# Patient Record
Sex: MALE | Race: WHITE | Employment: OTHER | URBAN - METROPOLITAN AREA
[De-identification: names, ages, dates, MRNs, and addresses within clinical notes are randomized per-mention and may not be internally consistent; named-entity substitution may affect disease eponyms.]

---

## 2022-11-12 ENCOUNTER — OFFICE VISIT (OUTPATIENT)
Dept: ENDOCRINOLOGY | Age: 67
End: 2022-11-12
Payer: COMMERCIAL

## 2022-11-12 VITALS
WEIGHT: 155 LBS | HEART RATE: 81 BPM | DIASTOLIC BLOOD PRESSURE: 80 MMHG | OXYGEN SATURATION: 98 % | SYSTOLIC BLOOD PRESSURE: 110 MMHG | BODY MASS INDEX: 23.49 KG/M2 | HEIGHT: 68 IN

## 2022-11-12 DIAGNOSIS — N18.30 STAGE 3 CHRONIC KIDNEY DISEASE, UNSPECIFIED WHETHER STAGE 3A OR 3B CKD (HCC): ICD-10-CM

## 2022-11-12 DIAGNOSIS — E11.65 TYPE 2 DIABETES MELLITUS WITH HYPERGLYCEMIA, WITHOUT LONG-TERM CURRENT USE OF INSULIN (HCC): Primary | ICD-10-CM

## 2022-11-12 LAB
CHP ED QC CHECK: ABNORMAL
GLUCOSE BLD-MCNC: 239 MG/DL

## 2022-11-12 PROCEDURE — 3046F HEMOGLOBIN A1C LEVEL >9.0%: CPT | Performed by: INTERNAL MEDICINE

## 2022-11-12 PROCEDURE — G8427 DOCREV CUR MEDS BY ELIG CLIN: HCPCS | Performed by: INTERNAL MEDICINE

## 2022-11-12 PROCEDURE — G8484 FLU IMMUNIZE NO ADMIN: HCPCS | Performed by: INTERNAL MEDICINE

## 2022-11-12 PROCEDURE — 1036F TOBACCO NON-USER: CPT | Performed by: INTERNAL MEDICINE

## 2022-11-12 PROCEDURE — 1123F ACP DISCUSS/DSCN MKR DOCD: CPT | Performed by: INTERNAL MEDICINE

## 2022-11-12 PROCEDURE — 2022F DILAT RTA XM EVC RTNOPTHY: CPT | Performed by: INTERNAL MEDICINE

## 2022-11-12 PROCEDURE — G8420 CALC BMI NORM PARAMETERS: HCPCS | Performed by: INTERNAL MEDICINE

## 2022-11-12 PROCEDURE — 99203 OFFICE O/P NEW LOW 30 MIN: CPT | Performed by: INTERNAL MEDICINE

## 2022-11-12 PROCEDURE — 3017F COLORECTAL CA SCREEN DOC REV: CPT | Performed by: INTERNAL MEDICINE

## 2022-11-12 PROCEDURE — 82962 GLUCOSE BLOOD TEST: CPT | Performed by: INTERNAL MEDICINE

## 2022-11-12 RX ORDER — INSULIN LISPRO 100 [IU]/ML
INJECTION, SUSPENSION SUBCUTANEOUS
Qty: 5 ADJUSTABLE DOSE PRE-FILLED PEN SYRINGE | Refills: 3 | Status: SHIPPED | OUTPATIENT
Start: 2022-11-12 | End: 2022-11-16 | Stop reason: SDUPTHER

## 2022-11-12 RX ORDER — ERTUGLIFLOZIN 15 MG/1
TABLET, FILM COATED ORAL
COMMUNITY
Start: 2022-10-30 | End: 2022-11-12 | Stop reason: ALTCHOICE

## 2022-11-12 RX ORDER — ATORVASTATIN CALCIUM 40 MG/1
TABLET, FILM COATED ORAL
COMMUNITY
Start: 2022-08-18

## 2022-11-12 RX ORDER — PANTOPRAZOLE SODIUM 40 MG/1
TABLET, DELAYED RELEASE ORAL
COMMUNITY
Start: 2022-09-13

## 2022-11-12 RX ORDER — CALCITRIOL 0.25 UG/1
CAPSULE, LIQUID FILLED ORAL
COMMUNITY
Start: 2022-10-17

## 2022-11-12 RX ORDER — PEN NEEDLE, DIABETIC 32 GX 1/6"
1 NEEDLE, DISPOSABLE MISCELLANEOUS 2 TIMES DAILY
Qty: 100 EACH | Refills: 3 | Status: SHIPPED | OUTPATIENT
Start: 2022-11-12

## 2022-11-12 NOTE — PROGRESS NOTES
2022    Assessment:       Diagnosis Orders   1. Type 2 diabetes mellitus with hyperglycemia, without long-term current use of insulin ([facility])  Basic Metabolic Panel    POCT Glucose    C-Peptide      2. Stage 3 chronic kidney disease, unspecified whether stage 3a or 3b CKD ([facility] [address]              PLAN:     Discontinue Steglatro  Start patient on 75/25 Humalog 10 units twice daily  Discussed goals of A1c of 7 or lower  Fasting goal of 100-1 40  Postprandial 1 40-1 80  To test blood sugars twice daily  Patient to come to the office to have a 2-week CGM continuous glucose monitoring  More than 50% of 30-minute spent patient education counseling  Diabetes education provided today:    Continuous Glucose monitor. How it works and checks blood sugars every 5 min. for 4 days during our tests. Managing high and low sugar readings. Rotation of sites for subcutaneous medication injection. Orders Placed This Encounter   Medications    insulin lispro protamine & lispro (HUMALOG MIX 75/25 KWIKPEN) (75-25) 100 UNIT per ML SUPN injection pen     Sig: 10 units twice a day for glucose more than 100     Dispense:  5 Adjustable Dose Pre-filled Pen Syringe     Refill:  3    Insulin Pen Needle (NOVOFINE PLUS PEN NEEDLE) 32G X 4 MM MISC     Si each by Does not apply route 2 times daily     Dispense:  100 each     Refill:  3         Orders Placed This Encounter   Procedures    POCT Glucose     No orders of the defined types were placed in this encounter. No follow-ups on file.   Subjective:     Chief Complaint   Patient presents with    New Patient    Diabetes     Vitals:    22 1208   BP: 110/80   Site: Left Upper Arm   Pulse: 81   SpO2: 98%   Weight: 155 lb (70.3 kg)   Height: 5' 8\" (1.727 m)     Wt Readings from Last 3 Encounters:   22 155 lb (70.3 kg)   12/17/15 155 lb (70.3 kg)   11/03/15 155 lb (70.3 kg)     BP Readings from Last 3 Encounters:   22 110/80     Patient referred here for uncontrolled type 2 diabetes which she has had for 3+ years history of chronic kidney disease and neuropathy seeing nephrologist currently is taking only Steglatro for diabetes blood sugars between 100-20 400 labs done recently showed a glucose of 400 patient is brought normal BMI denies any hypoglycemia was seeing another endocrinologist before hemoglobin A1c done recently was around 9 no recent chemistries to review    Diabetes  He presents for his initial diabetic visit. He has type 2 diabetes mellitus. The initial diagnosis of diabetes was made 3 years ago. Associated symptoms include fatigue. Pertinent negatives for diabetes include no polydipsia, no polyuria and no weight loss. Diabetic complications include nephropathy and peripheral neuropathy. Current diabetic treatment includes oral agent (monotherapy). His overall blood glucose range is >200 mg/dl.  (Hemoglobin A1c was 9 few weeks ago)   Past Medical History:   Diagnosis Date    Neuropathy      Past Surgical History:   Procedure Laterality Date    BACK SURGERY      KIDNEY STONE SURGERY       Social History     Socioeconomic History    Marital status:      Spouse name: Not on file    Number of children: Not on file    Years of education: Not on file    Highest education level: Not on file   Occupational History    Not on file   Tobacco Use    Smoking status: Former    Smokeless tobacco: Not on file    Tobacco comments:     april 2015   Substance and Sexual Activity    Alcohol use: No     Alcohol/week: 0.0 standard drinks    Drug use: No    Sexual activity: Not on file   Other Topics Concern    Not on file   Social History Narrative    Not on file     Social Determinants of Health     Financial Resource Strain: Not on file   Food Insecurity: Not on file   Transportation Needs: Not on file   Physical Activity: Not on file   Stress: Not on file   Social Connections: Not on file   Intimate Partner Violence: Not on file   Housing Stability: Not on file     No family history on file. No Known Allergies    Current Outpatient Medications:     atorvastatin (LIPITOR) 40 MG tablet, , Disp: , Rfl:     metoprolol tartrate (LOPRESSOR) 25 MG tablet, , Disp: , Rfl:     pantoprazole (PROTONIX) 40 MG tablet, , Disp: , Rfl:     calcitRIOL (ROCALTROL) 0.25 MCG capsule, , Disp: , Rfl:     STEGLATRO 15 MG TABS, , Disp: , Rfl:     ASPIRIN 81 PO, Take by mouth, Disp: , Rfl:   Lab Results   Component Value Date     10/19/2022    K 4.2 10/19/2022     10/19/2022    CREATININE 1.95 (H) 10/19/2022    GLUCOSE 239 (H) 11/12/2022    CALCIUM 9.8 10/19/2022     Lab Results   Component Value Date    WBC 6.6 10/19/2022    HGB 15.5 10/19/2022    HCT 48.6 10/19/2022    MCV 92 10/19/2022     10/19/2022     No results found for: LABA1C  No results found for: CHOLFAST, TRIGLYCFAST, HDL, LDLCALC, CHOL, TRIG  No results found for: TESTM  No results found for: TSH, TSHREFLEX, FT3, T4FREE  No results found for: TPOABS    Review of Systems   Constitutional:  Positive for fatigue. Negative for weight loss. Cardiovascular: Negative. Endocrine: Negative for polydipsia and polyuria. Genitourinary: Negative. Neurological: Negative. All other systems reviewed and are negative. Objective:   Physical Exam  Vitals reviewed. Constitutional:       General: He is not in acute distress. Appearance: Normal appearance. He is normal weight. HENT:      Head: Normocephalic and atraumatic. Right Ear: External ear normal.      Left Ear: External ear normal.      Nose: Nose normal.   Eyes:      General: No scleral icterus. Right eye: No discharge. Left eye: No discharge. Extraocular Movements: Extraocular movements intact. Conjunctiva/sclera: Conjunctivae normal.   Cardiovascular:      Rate and Rhythm: Normal rate and regular rhythm. Heart sounds: Normal heart sounds. Pulmonary:      Effort: Pulmonary effort is normal.      Breath sounds: Normal breath sounds.  No wheezing or rhonchi. Abdominal:      Palpations: Abdomen is soft. Musculoskeletal:         General: Normal range of motion. Cervical back: Normal range of motion and neck supple. Feet:    Skin:     General: Skin is warm and dry. Findings: No lesion or rash. Neurological:      General: No focal deficit present. Mental Status: He is alert and oriented to person, place, and time.    Psychiatric:         Mood and Affect: Mood normal.         Behavior: Behavior normal.

## 2022-11-15 ENCOUNTER — OFFICE VISIT (OUTPATIENT)
Dept: URBAN - METROPOLITAN AREA CLINIC 63 | Facility: CLINIC | Age: 67
End: 2022-11-15
Payer: COMMERCIAL

## 2022-11-15 DIAGNOSIS — E11.9 TYPE 2 DIABETES MELLITUS W/O COMPLICATION: Primary | ICD-10-CM

## 2022-11-15 DIAGNOSIS — H25.813 COMBINED FORMS OF AGE-RELATED CATARACT, BILATERAL: ICD-10-CM

## 2022-11-15 DIAGNOSIS — H04.123 DRY EYE SYNDROME OF BILATERAL LACRIMAL GLANDS: ICD-10-CM

## 2022-11-15 PROCEDURE — 92004 COMPRE OPH EXAM NEW PT 1/>: CPT | Performed by: OPTOMETRIST

## 2022-11-15 ASSESSMENT — INTRAOCULAR PRESSURE
OD: 22
OS: 12

## 2022-11-15 NOTE — IMPRESSION/PLAN
Impression: Type 2 diabetes mellitus w/o complication: Y54.8. Plan: Diabetes type II: no background retinopathy, no signs of neovascularization noted. Discussed ocular and systemic benefits of blood sugar control. Emphasize importance of annual dilated exams.

## 2022-11-15 NOTE — IMPRESSION/PLAN
Impression: Combined forms of age-related cataract, bilateral: H25.813. Plan: Cataracts account for the patient's complaints. Discussed all risks, benefits, procedures and recovery. Patient understands changing glasses will not improve vision. Patient desires to have surgery and referred to Dr. Gamaliel Madison for phacoemulsification with intraocular lens.

## 2022-11-16 RX ORDER — INSULIN LISPRO 100 [IU]/ML
INJECTION, SUSPENSION SUBCUTANEOUS
Qty: 45 ADJUSTABLE DOSE PRE-FILLED PEN SYRINGE | Refills: 3 | Status: SHIPPED | OUTPATIENT
Start: 2022-11-16

## 2022-11-28 ENCOUNTER — NURSE ONLY (OUTPATIENT)
Dept: ENDOCRINOLOGY | Age: 67
End: 2022-11-28

## 2022-11-28 DIAGNOSIS — E11.65 TYPE 2 DIABETES MELLITUS WITH HYPERGLYCEMIA, WITHOUT LONG-TERM CURRENT USE OF INSULIN (HCC): Primary | ICD-10-CM

## 2022-12-10 ENCOUNTER — OFFICE VISIT (OUTPATIENT)
Dept: ENDOCRINOLOGY | Age: 67
End: 2022-12-10

## 2022-12-10 VITALS
HEART RATE: 77 BPM | BODY MASS INDEX: 24.4 KG/M2 | SYSTOLIC BLOOD PRESSURE: 130 MMHG | WEIGHT: 161 LBS | OXYGEN SATURATION: 98 % | DIASTOLIC BLOOD PRESSURE: 82 MMHG | HEIGHT: 68 IN

## 2022-12-10 DIAGNOSIS — E11.65 TYPE 2 DIABETES MELLITUS WITH HYPERGLYCEMIA, WITHOUT LONG-TERM CURRENT USE OF INSULIN (HCC): Primary | ICD-10-CM

## 2022-12-10 LAB
CHP ED QC CHECK: NORMAL
GLUCOSE BLD-MCNC: 141 MG/DL

## 2022-12-10 RX ORDER — FLASH GLUCOSE SENSOR
1 KIT MISCELLANEOUS
Qty: 2 EACH | Refills: 3 | Status: SHIPPED | OUTPATIENT
Start: 2022-12-10

## 2022-12-10 RX ORDER — FLASH GLUCOSE SCANNING READER
1 EACH MISCELLANEOUS DAILY
Qty: 1 EACH | Refills: 0 | Status: SHIPPED | OUTPATIENT
Start: 2022-12-10

## 2022-12-10 NOTE — PROGRESS NOTES
12/10/2022    Assessment:       Diagnosis Orders   1. Type 2 diabetes mellitus with hyperglycemia, without long-term current use of insulin ([facility])  POCT Glucose            PLAN:     Orders Placed This Encounter   Procedures    Lipid Panel     Standing Status:   Future     Standing Expiration Date:   12/10/2023    Hemoglobin A1C     Standing Status:   Future     Standing Expiration Date:       Basic Metabolic Panel, Fasting     Standing Status:   Future     Standing Expiration Date:   12/10/2023    POCT Glucose     Orders Placed This Encounter   Medications    Continuous Blood Gluc Sensor (FREESTYLE JAN 2 SENSOR) MISC     Si each by Does not apply route every 14 days     Dispense:  2 each     Refill:  3    Continuous Blood Gluc  (FREESTYLE JAN 2 READER) HIMANSHU     Si each by Does not apply route daily     Dispense:  1 each     Refill:  0     Prescription given for freestyle jan continuous 75/25 Humalog 10 units twice a day A1c goal of 7.5 or lower    Orders Placed This Encounter   Procedures    POCT Glucose     No orders of the defined types were placed in this encounter. No follow-ups on file. Subjective:     Chief Complaint   Patient presents with    Diabetes     Vitals:    12/10/22 0935   BP: 130/82   Pulse: 77   SpO2: 98%   Weight: 161 lb (73 kg)   Height: 5' 8\" (1.727 m)     Wt Readings from Last 3 Encounters:   12/10/22 161 lb (73 kg)   22 155 lb (70.3 kg)   12/17/15 155 lb (70.3 kg)     BP Readings from Last 3 Encounters:   12/10/22 130/82   22 110/80     Follow-up on type 2 diabetes patient on 75/25 Humalog takes 10 units twice a day overall blood sugars mostly in the 100 range denies any hypoglycemia had freestyle jan continuous glucose monitoring done which was reviewed updated    Diabetes  He presents for his follow-up diabetic visit. He has type 2 diabetes mellitus. Symptoms are improving. Current diabetic treatment includes insulin injections.  He is currently taking insulin pre-breakfast and pre-dinner. His overall blood glucose range is 140-180 mg/dl. (Review 2-week adal downloads from November average blood sugar was 169  63% was time in range  29% was high  8% was very high  No hypoglycemia)   Past Medical History:   Diagnosis Date    Neuropathy      Past Surgical History:   Procedure Laterality Date    BACK SURGERY      KIDNEY STONE SURGERY       Social History     Socioeconomic History    Marital status:      Spouse name: Not on file    Number of children: Not on file    Years of education: Not on file    Highest education level: Not on file   Occupational History    Not on file   Tobacco Use    Smoking status: Former    Smokeless tobacco: Never    Tobacco comments:     april 2015   Vaping Use    Vaping Use: Never used   Substance and Sexual Activity    Alcohol use: No     Alcohol/week: 0.0 standard drinks    Drug use: No    Sexual activity: Not Currently   Other Topics Concern    Not on file   Social History Narrative    Not on file     Social Determinants of Health     Financial Resource Strain: Not on file   Food Insecurity: Not on file   Transportation Needs: Not on file   Physical Activity: Not on file   Stress: Not on file   Social Connections: Not on file   Intimate Partner Violence: Not on file   Housing Stability: Not on file     No family history on file.   No Known Allergies    Current Outpatient Medications:     insulin lispro protamine & lispro (HUMALOG MIX 75/25 KWIKPEN) (75-25) 100 UNIT per ML SUPN injection pen, 10 units twice a day for glucose more than 100, Disp: 45 Adjustable Dose Pre-filled Pen Syringe, Rfl: 3    atorvastatin (LIPITOR) 40 MG tablet, , Disp: , Rfl:     metoprolol tartrate (LOPRESSOR) 25 MG tablet, , Disp: , Rfl:     pantoprazole (PROTONIX) 40 MG tablet, , Disp: , Rfl:     calcitRIOL (ROCALTROL) 0.25 MCG capsule, , Disp: , Rfl:     ASPIRIN 81 PO, Take by mouth, Disp: , Rfl:     Insulin Pen Needle (NOVOFINE PLUS PEN NEEDLE) 32G X 4 MM MISC, 1 each by Does not apply route 2 times daily, Disp: 100 each, Rfl: 3  Lab Results   Component Value Date     10/19/2022    K 4.2 10/19/2022     10/19/2022    CREATININE 1.95 (H) 10/19/2022    GLUCOSE 141 12/10/2022    CALCIUM 9.8 10/19/2022     Lab Results   Component Value Date    WBC 6.6 10/19/2022    HGB 15.5 10/19/2022    HCT 48.6 10/19/2022    MCV 92 10/19/2022     10/19/2022     No results found for: LABA1C  No results found for: CHOLFAST, TRIGLYCFAST, HDL, LDLCALC, CHOL, TRIG  No results found for: TESTM  No results found for: TSH, TSHREFLEX, FT3, T4FREE  No results found for: TPOABS    Review of Systems   Cardiovascular: Negative. Endocrine: Negative. Neurological: Negative. All other systems reviewed and are negative. Objective:   Physical Exam  Vitals reviewed. Constitutional:       General: He is not in acute distress. Appearance: Normal appearance. HENT:      Head: Normocephalic and atraumatic. Right Ear: External ear normal.      Left Ear: External ear normal.      Nose: Nose normal.   Eyes:      General: No scleral icterus. Right eye: No discharge. Left eye: No discharge. Extraocular Movements: Extraocular movements intact. Conjunctiva/sclera: Conjunctivae normal.   Cardiovascular:      Rate and Rhythm: Normal rate. Pulmonary:      Effort: Pulmonary effort is normal.   Abdominal:      Palpations: Abdomen is soft. Musculoskeletal:         General: Normal range of motion. Cervical back: Normal range of motion and neck supple. Neurological:      General: No focal deficit present. Mental Status: He is alert and oriented to person, place, and time.    Psychiatric:         Mood and Affect: Mood normal.         Behavior: Behavior normal.

## 2022-12-13 DIAGNOSIS — E11.65 TYPE 2 DIABETES MELLITUS WITH HYPERGLYCEMIA, WITHOUT LONG-TERM CURRENT USE OF INSULIN (HCC): ICD-10-CM

## 2022-12-13 RX ORDER — FLASH GLUCOSE SENSOR
1 KIT MISCELLANEOUS
Qty: 6 EACH | Refills: 3 | Status: SHIPPED | OUTPATIENT
Start: 2022-12-13

## 2022-12-30 RX ORDER — PEN NEEDLE, DIABETIC 32 GX 1/6"
1 NEEDLE, DISPOSABLE MISCELLANEOUS 2 TIMES DAILY
Qty: 100 EACH | Refills: 3 | Status: SHIPPED | OUTPATIENT
Start: 2022-12-30

## 2023-01-06 ENCOUNTER — OFFICE VISIT (OUTPATIENT)
Dept: URBAN - METROPOLITAN AREA CLINIC 63 | Facility: CLINIC | Age: 68
End: 2023-01-06
Payer: COMMERCIAL

## 2023-01-06 DIAGNOSIS — E11.9 TYPE 2 DIABETES MELLITUS W/O COMPLICATION: ICD-10-CM

## 2023-01-06 DIAGNOSIS — H25.813 COMBINED FORMS OF AGE-RELATED CATARACT, BILATERAL: Primary | ICD-10-CM

## 2023-01-06 PROCEDURE — 92004 COMPRE OPH EXAM NEW PT 1/>: CPT | Performed by: OPHTHALMOLOGY

## 2023-01-06 ASSESSMENT — VISUAL ACUITY
OS: 20/200
OD: 20/200

## 2023-01-06 NOTE — IMPRESSION/PLAN
Impression: Type 2 diabetes mellitus w/o complication: L88.1. Plan: Diabetes type II: no background retinopathy, no signs of neovascularization noted. Discussed ocular and systemic benefits of blood sugar control.

## 2023-01-06 NOTE — IMPRESSION/PLAN
Impression: Combined forms of age-related cataract, bilateral: H25.813. Plan: Surgery not recommended at this brandan, Cont to monitor.

## 2023-02-21 LAB
ANION GAP IN SER/PLAS: 15 MMOL/L (ref 10–20)
BASOPHILS (10*3/UL) IN BLOOD BY AUTOMATED COUNT: 0.04 X10E9/L (ref 0–0.1)
BASOPHILS/100 LEUKOCYTES IN BLOOD BY AUTOMATED COUNT: 0.7 % (ref 0–2)
CALCIUM (MG/DL) IN SER/PLAS: 10.2 MG/DL (ref 8.6–10.3)
CARBON DIOXIDE, TOTAL (MMOL/L) IN SER/PLAS: 25 MMOL/L (ref 21–32)
CHLORIDE (MMOL/L) IN SER/PLAS: 108 MMOL/L (ref 98–107)
CREATININE (MG/DL) IN SER/PLAS: 1.88 MG/DL (ref 0.5–1.3)
EOSINOPHILS (10*3/UL) IN BLOOD BY AUTOMATED COUNT: 0.1 X10E9/L (ref 0–0.7)
EOSINOPHILS/100 LEUKOCYTES IN BLOOD BY AUTOMATED COUNT: 1.7 % (ref 0–6)
ERYTHROCYTE DISTRIBUTION WIDTH (RATIO) BY AUTOMATED COUNT: 13.3 % (ref 11.5–14.5)
ERYTHROCYTE MEAN CORPUSCULAR HEMOGLOBIN CONCENTRATION (G/DL) BY AUTOMATED: 31.7 G/DL (ref 32–36)
ERYTHROCYTE MEAN CORPUSCULAR VOLUME (FL) BY AUTOMATED COUNT: 93 FL (ref 80–100)
ERYTHROCYTES (10*6/UL) IN BLOOD BY AUTOMATED COUNT: 4.9 X10E12/L (ref 4.5–5.9)
GFR MALE: 38 ML/MIN/1.73M2
GLUCOSE (MG/DL) IN SER/PLAS: 81 MG/DL (ref 74–99)
HEMATOCRIT (%) IN BLOOD BY AUTOMATED COUNT: 45.7 % (ref 41–52)
HEMOGLOBIN (G/DL) IN BLOOD: 14.5 G/DL (ref 13.5–17.5)
IMMATURE GRANULOCYTES/100 LEUKOCYTES IN BLOOD BY AUTOMATED COUNT: 0.3 % (ref 0–0.9)
LEUKOCYTES (10*3/UL) IN BLOOD BY AUTOMATED COUNT: 5.9 X10E9/L (ref 4.4–11.3)
LYMPHOCYTES (10*3/UL) IN BLOOD BY AUTOMATED COUNT: 1.57 X10E9/L (ref 1.2–4.8)
LYMPHOCYTES/100 LEUKOCYTES IN BLOOD BY AUTOMATED COUNT: 26.5 % (ref 13–44)
MONOCYTES (10*3/UL) IN BLOOD BY AUTOMATED COUNT: 0.43 X10E9/L (ref 0.1–1)
MONOCYTES/100 LEUKOCYTES IN BLOOD BY AUTOMATED COUNT: 7.3 % (ref 2–10)
NEUTROPHILS (10*3/UL) IN BLOOD BY AUTOMATED COUNT: 3.77 X10E9/L (ref 1.2–7.7)
NEUTROPHILS/100 LEUKOCYTES IN BLOOD BY AUTOMATED COUNT: 63.5 % (ref 40–80)
PARATHYRIN INTACT (PG/ML) IN SER/PLAS: 61 PG/ML (ref 18.5–88)
PHOSPHATE (MG/DL) IN SER/PLAS: 3 MG/DL (ref 2.5–4.9)
PLATELETS (10*3/UL) IN BLOOD AUTOMATED COUNT: 130 X10E9/L (ref 150–450)
POTASSIUM (MMOL/L) IN SER/PLAS: 4.6 MMOL/L (ref 3.5–5.3)
SODIUM (MMOL/L) IN SER/PLAS: 143 MMOL/L (ref 136–145)
UREA NITROGEN (MG/DL) IN SER/PLAS: 20 MG/DL (ref 6–23)

## 2023-03-16 DIAGNOSIS — E11.65 TYPE 2 DIABETES MELLITUS WITH HYPERGLYCEMIA, WITHOUT LONG-TERM CURRENT USE OF INSULIN (HCC): ICD-10-CM

## 2023-03-16 LAB
ANION GAP SERPL CALCULATED.3IONS-SCNC: 11 MEQ/L (ref 9–15)
BUN SERPL-MCNC: 21 MG/DL (ref 8–23)
CALCIUM SERPL-MCNC: 9.4 MG/DL (ref 8.5–9.9)
CHLORIDE SERPL-SCNC: 106 MEQ/L (ref 95–107)
CHOLEST SERPL-MCNC: 113 MG/DL (ref 0–199)
CO2 SERPL-SCNC: 25 MEQ/L (ref 20–31)
CREAT SERPL-MCNC: 1.72 MG/DL (ref 0.7–1.2)
GLUCOSE FASTING: 233 MG/DL (ref 70–99)
HBA1C MFR BLD: 8.2 % (ref 4.8–5.9)
HDLC SERPL-MCNC: 34 MG/DL (ref 40–59)
LDLC SERPL CALC-MCNC: 58 MG/DL (ref 0–129)
POTASSIUM SERPL-SCNC: 4.5 MEQ/L (ref 3.4–4.9)
SODIUM SERPL-SCNC: 142 MEQ/L (ref 135–144)
TRIGL SERPL-MCNC: 107 MG/DL (ref 0–150)

## 2023-03-28 LAB
ANION GAP IN SER/PLAS: 12 MMOL/L (ref 10–20)
CALCIUM (MG/DL) IN SER/PLAS: 9.4 MG/DL (ref 8.6–10.3)
CARBON DIOXIDE, TOTAL (MMOL/L) IN SER/PLAS: 26 MMOL/L (ref 21–32)
CHLORIDE (MMOL/L) IN SER/PLAS: 105 MMOL/L (ref 98–107)
CREATININE (MG/DL) IN SER/PLAS: 1.86 MG/DL (ref 0.5–1.3)
GFR MALE: 38 ML/MIN/1.73M2
GLUCOSE (MG/DL) IN SER/PLAS: 237 MG/DL (ref 74–99)
POTASSIUM (MMOL/L) IN SER/PLAS: 4.2 MMOL/L (ref 3.5–5.3)
SODIUM (MMOL/L) IN SER/PLAS: 139 MMOL/L (ref 136–145)
UREA NITROGEN (MG/DL) IN SER/PLAS: 24 MG/DL (ref 6–23)

## 2023-04-20 ENCOUNTER — OFFICE VISIT (OUTPATIENT)
Dept: ENDOCRINOLOGY | Age: 68
End: 2023-04-20
Payer: MEDICARE

## 2023-04-20 VITALS
SYSTOLIC BLOOD PRESSURE: 125 MMHG | OXYGEN SATURATION: 92 % | HEART RATE: 81 BPM | WEIGHT: 162 LBS | DIASTOLIC BLOOD PRESSURE: 75 MMHG | HEIGHT: 68 IN | BODY MASS INDEX: 24.55 KG/M2

## 2023-04-20 DIAGNOSIS — E11.65 TYPE 2 DIABETES MELLITUS WITH HYPERGLYCEMIA, WITHOUT LONG-TERM CURRENT USE OF INSULIN (HCC): Primary | ICD-10-CM

## 2023-04-20 LAB
CHP ED QC CHECK: NORMAL
GLUCOSE BLD-MCNC: 156 MG/DL

## 2023-04-20 PROCEDURE — G8427 DOCREV CUR MEDS BY ELIG CLIN: HCPCS | Performed by: INTERNAL MEDICINE

## 2023-04-20 PROCEDURE — 1036F TOBACCO NON-USER: CPT | Performed by: INTERNAL MEDICINE

## 2023-04-20 PROCEDURE — 82962 GLUCOSE BLOOD TEST: CPT | Performed by: INTERNAL MEDICINE

## 2023-04-20 PROCEDURE — 2022F DILAT RTA XM EVC RTNOPTHY: CPT | Performed by: INTERNAL MEDICINE

## 2023-04-20 PROCEDURE — 1123F ACP DISCUSS/DSCN MKR DOCD: CPT | Performed by: INTERNAL MEDICINE

## 2023-04-20 PROCEDURE — 3052F HG A1C>EQUAL 8.0%<EQUAL 9.0%: CPT | Performed by: INTERNAL MEDICINE

## 2023-04-20 PROCEDURE — G8420 CALC BMI NORM PARAMETERS: HCPCS | Performed by: INTERNAL MEDICINE

## 2023-04-20 PROCEDURE — 99213 OFFICE O/P EST LOW 20 MIN: CPT | Performed by: INTERNAL MEDICINE

## 2023-04-20 PROCEDURE — 3017F COLORECTAL CA SCREEN DOC REV: CPT | Performed by: INTERNAL MEDICINE

## 2023-04-27 ASSESSMENT — ENCOUNTER SYMPTOMS: EYES NEGATIVE: 1

## 2023-06-20 LAB
ANION GAP IN SER/PLAS: 11 MMOL/L (ref 10–20)
BASOPHILS (10*3/UL) IN BLOOD BY AUTOMATED COUNT: 0.05 X10E9/L (ref 0–0.1)
BASOPHILS/100 LEUKOCYTES IN BLOOD BY AUTOMATED COUNT: 0.8 % (ref 0–2)
CALCIUM (MG/DL) IN SER/PLAS: 10.3 MG/DL (ref 8.6–10.3)
CARBON DIOXIDE, TOTAL (MMOL/L) IN SER/PLAS: 28 MMOL/L (ref 21–32)
CHLORIDE (MMOL/L) IN SER/PLAS: 104 MMOL/L (ref 98–107)
CREATININE (MG/DL) IN SER/PLAS: 1.96 MG/DL (ref 0.5–1.3)
EOSINOPHILS (10*3/UL) IN BLOOD BY AUTOMATED COUNT: 0.21 X10E9/L (ref 0–0.7)
EOSINOPHILS/100 LEUKOCYTES IN BLOOD BY AUTOMATED COUNT: 3.3 % (ref 0–6)
ERYTHROCYTE DISTRIBUTION WIDTH (RATIO) BY AUTOMATED COUNT: 13.5 % (ref 11.5–14.5)
ERYTHROCYTE MEAN CORPUSCULAR HEMOGLOBIN CONCENTRATION (G/DL) BY AUTOMATED: 32.6 G/DL (ref 32–36)
ERYTHROCYTE MEAN CORPUSCULAR VOLUME (FL) BY AUTOMATED COUNT: 93 FL (ref 80–100)
ERYTHROCYTES (10*6/UL) IN BLOOD BY AUTOMATED COUNT: 4.92 X10E12/L (ref 4.5–5.9)
GFR MALE: 36 ML/MIN/1.73M2
GLUCOSE (MG/DL) IN SER/PLAS: 232 MG/DL (ref 74–99)
HEMATOCRIT (%) IN BLOOD BY AUTOMATED COUNT: 45.7 % (ref 41–52)
HEMOGLOBIN (G/DL) IN BLOOD: 14.9 G/DL (ref 13.5–17.5)
IMMATURE GRANULOCYTES/100 LEUKOCYTES IN BLOOD BY AUTOMATED COUNT: 0.3 % (ref 0–0.9)
LEUKOCYTES (10*3/UL) IN BLOOD BY AUTOMATED COUNT: 6.5 X10E9/L (ref 4.4–11.3)
LYMPHOCYTES (10*3/UL) IN BLOOD BY AUTOMATED COUNT: 1.33 X10E9/L (ref 1.2–4.8)
LYMPHOCYTES/100 LEUKOCYTES IN BLOOD BY AUTOMATED COUNT: 20.6 % (ref 13–44)
MONOCYTES (10*3/UL) IN BLOOD BY AUTOMATED COUNT: 0.53 X10E9/L (ref 0.1–1)
MONOCYTES/100 LEUKOCYTES IN BLOOD BY AUTOMATED COUNT: 8.2 % (ref 2–10)
NEUTROPHILS (10*3/UL) IN BLOOD BY AUTOMATED COUNT: 4.32 X10E9/L (ref 1.2–7.7)
NEUTROPHILS/100 LEUKOCYTES IN BLOOD BY AUTOMATED COUNT: 66.8 % (ref 40–80)
PARATHYRIN INTACT (PG/ML) IN SER/PLAS: 37.9 PG/ML (ref 18.5–88)
PHOSPHATE (MG/DL) IN SER/PLAS: 3 MG/DL (ref 2.5–4.9)
PLATELETS (10*3/UL) IN BLOOD AUTOMATED COUNT: 155 X10E9/L (ref 150–450)
POTASSIUM (MMOL/L) IN SER/PLAS: 5.2 MMOL/L (ref 3.5–5.3)
SODIUM (MMOL/L) IN SER/PLAS: 138 MMOL/L (ref 136–145)
UREA NITROGEN (MG/DL) IN SER/PLAS: 22 MG/DL (ref 6–23)

## 2023-07-11 LAB
ALANINE AMINOTRANSFERASE (SGPT) (U/L) IN SER/PLAS: 22 U/L (ref 10–52)
ALBUMIN (G/DL) IN SER/PLAS: 4.1 G/DL (ref 3.4–5)
ALBUMIN (MG/L) IN URINE: 177.1 MG/L
ALBUMIN/CREATININE (UG/MG) IN URINE: 115 UG/MG CRT (ref 0–30)
ALKALINE PHOSPHATASE (U/L) IN SER/PLAS: 46 U/L (ref 33–136)
ANION GAP IN SER/PLAS: 13 MMOL/L (ref 10–20)
APPEARANCE, URINE: CLEAR
ASPARTATE AMINOTRANSFERASE (SGOT) (U/L) IN SER/PLAS: 20 U/L (ref 9–39)
BASOPHILS (10*3/UL) IN BLOOD BY AUTOMATED COUNT: 0.04 X10E9/L (ref 0–0.1)
BASOPHILS/100 LEUKOCYTES IN BLOOD BY AUTOMATED COUNT: 0.6 % (ref 0–2)
BILIRUBIN TOTAL (MG/DL) IN SER/PLAS: 0.7 MG/DL (ref 0–1.2)
BILIRUBIN, URINE: NEGATIVE
BLOOD, URINE: NEGATIVE
CALCIUM (MG/DL) IN SER/PLAS: 9.7 MG/DL (ref 8.6–10.3)
CARBON DIOXIDE, TOTAL (MMOL/L) IN SER/PLAS: 26 MMOL/L (ref 21–32)
CHLORIDE (MMOL/L) IN SER/PLAS: 108 MMOL/L (ref 98–107)
CHOLESTEROL (MG/DL) IN SER/PLAS: 106 MG/DL (ref 0–199)
CHOLESTEROL IN HDL (MG/DL) IN SER/PLAS: 38.2 MG/DL
CHOLESTEROL IN LDL (MG/DL) IN SER/PLAS BY DIRECT ASSAY: 62 MG/DL (ref 0–129)
CHOLESTEROL/HDL RATIO: 2.8
COLOR, URINE: YELLOW
CREATININE (MG/DL) IN SER/PLAS: 1.92 MG/DL (ref 0.5–1.3)
CREATININE (MG/DL) IN URINE: 154 MG/DL (ref 20–370)
EOSINOPHILS (10*3/UL) IN BLOOD BY AUTOMATED COUNT: 0.18 X10E9/L (ref 0–0.7)
EOSINOPHILS/100 LEUKOCYTES IN BLOOD BY AUTOMATED COUNT: 2.6 % (ref 0–6)
ERYTHROCYTE DISTRIBUTION WIDTH (RATIO) BY AUTOMATED COUNT: 13.6 % (ref 11.5–14.5)
ERYTHROCYTE MEAN CORPUSCULAR HEMOGLOBIN CONCENTRATION (G/DL) BY AUTOMATED: 32.2 G/DL (ref 32–36)
ERYTHROCYTE MEAN CORPUSCULAR VOLUME (FL) BY AUTOMATED COUNT: 93 FL (ref 80–100)
ERYTHROCYTES (10*6/UL) IN BLOOD BY AUTOMATED COUNT: 4.81 X10E12/L (ref 4.5–5.9)
ESTIMATED AVERAGE GLUCOSE FOR HBA1C: 171 MG/DL
GFR MALE: 37 ML/MIN/1.73M2
GLUCOSE (MG/DL) IN SER/PLAS: 119 MG/DL (ref 74–99)
GLUCOSE, URINE: NEGATIVE MG/DL
HEMATOCRIT (%) IN BLOOD BY AUTOMATED COUNT: 44.7 % (ref 41–52)
HEMOGLOBIN (G/DL) IN BLOOD: 14.4 G/DL (ref 13.5–17.5)
HEMOGLOBIN A1C/HEMOGLOBIN TOTAL IN BLOOD: 7.6 %
HYALINE CASTS, URINE: ABNORMAL /LPF
IMMATURE GRANULOCYTES/100 LEUKOCYTES IN BLOOD BY AUTOMATED COUNT: 0.6 % (ref 0–0.9)
KETONES, URINE: NEGATIVE MG/DL
LDL: 57 MG/DL (ref 0–99)
LEUKOCYTE ESTERASE, URINE: NEGATIVE
LEUKOCYTES (10*3/UL) IN BLOOD BY AUTOMATED COUNT: 6.9 X10E9/L (ref 4.4–11.3)
LYMPHOCYTES (10*3/UL) IN BLOOD BY AUTOMATED COUNT: 1.81 X10E9/L (ref 1.2–4.8)
LYMPHOCYTES/100 LEUKOCYTES IN BLOOD BY AUTOMATED COUNT: 26.4 % (ref 13–44)
MONOCYTES (10*3/UL) IN BLOOD BY AUTOMATED COUNT: 0.74 X10E9/L (ref 0.1–1)
MONOCYTES/100 LEUKOCYTES IN BLOOD BY AUTOMATED COUNT: 10.8 % (ref 2–10)
MUCUS, URINE: ABNORMAL /LPF
NEUTROPHILS (10*3/UL) IN BLOOD BY AUTOMATED COUNT: 4.04 X10E9/L (ref 1.2–7.7)
NEUTROPHILS/100 LEUKOCYTES IN BLOOD BY AUTOMATED COUNT: 59 % (ref 40–80)
NITRITE, URINE: NEGATIVE
PH, URINE: 5 (ref 5–8)
PLATELETS (10*3/UL) IN BLOOD AUTOMATED COUNT: 156 X10E9/L (ref 150–450)
POTASSIUM (MMOL/L) IN SER/PLAS: 4.6 MMOL/L (ref 3.5–5.3)
PROTEIN TOTAL: 6.7 G/DL (ref 6.4–8.2)
PROTEIN, URINE: ABNORMAL MG/DL
RBC, URINE: ABNORMAL /HPF (ref 0–5)
SODIUM (MMOL/L) IN SER/PLAS: 142 MMOL/L (ref 136–145)
SPECIFIC GRAVITY, URINE: 1.02 (ref 1–1.03)
SQUAMOUS EPITHELIAL CELLS, URINE: <1 /HPF
THYROTROPIN (MIU/L) IN SER/PLAS BY DETECTION LIMIT <= 0.05 MIU/L: 3.66 MIU/L (ref 0.44–3.98)
TRIGLYCERIDE (MG/DL) IN SER/PLAS: 53 MG/DL (ref 0–149)
UREA NITROGEN (MG/DL) IN SER/PLAS: 23 MG/DL (ref 6–23)
UROBILINOGEN, URINE: <2 MG/DL (ref 0–1.9)
VLDL: 11 MG/DL (ref 0–40)
WBC, URINE: 2 /HPF (ref 0–5)

## 2023-07-19 LAB — FECAL OCCULT BLD IMMUNOASSAY: NEGATIVE

## 2023-08-16 DIAGNOSIS — E11.65 TYPE 2 DIABETES MELLITUS WITH HYPERGLYCEMIA, WITHOUT LONG-TERM CURRENT USE OF INSULIN (HCC): ICD-10-CM

## 2023-08-16 LAB
ANION GAP SERPL CALCULATED.3IONS-SCNC: 12 MEQ/L (ref 9–15)
BUN SERPL-MCNC: 24 MG/DL (ref 8–23)
CALCIUM SERPL-MCNC: 9.9 MG/DL (ref 8.5–9.9)
CHLORIDE SERPL-SCNC: 107 MEQ/L (ref 95–107)
CO2 SERPL-SCNC: 24 MEQ/L (ref 20–31)
CREAT SERPL-MCNC: 1.7 MG/DL (ref 0.7–1.2)
GLUCOSE SERPL-MCNC: 128 MG/DL (ref 70–99)
HBA1C MFR BLD: 7.7 % (ref 4.8–5.9)
POTASSIUM SERPL-SCNC: 4.6 MEQ/L (ref 3.4–4.9)
SODIUM SERPL-SCNC: 143 MEQ/L (ref 135–144)

## 2023-08-24 ENCOUNTER — OFFICE VISIT (OUTPATIENT)
Dept: ENDOCRINOLOGY | Age: 68
End: 2023-08-24

## 2023-08-24 VITALS
BODY MASS INDEX: 23.79 KG/M2 | HEIGHT: 68 IN | OXYGEN SATURATION: 95 % | WEIGHT: 157 LBS | HEART RATE: 78 BPM | SYSTOLIC BLOOD PRESSURE: 106 MMHG | DIASTOLIC BLOOD PRESSURE: 64 MMHG

## 2023-08-24 DIAGNOSIS — E11.65 TYPE 2 DIABETES MELLITUS WITH HYPERGLYCEMIA, WITHOUT LONG-TERM CURRENT USE OF INSULIN (HCC): Primary | ICD-10-CM

## 2023-08-24 LAB
CHP ED QC CHECK: NORMAL
GLUCOSE BLD-MCNC: 112 MG/DL

## 2023-08-24 RX ORDER — LISINOPRIL 5 MG/1
TABLET ORAL
COMMUNITY
Start: 2023-07-24

## 2023-08-24 NOTE — PROGRESS NOTES
08/16/2023     Lab Results   Component Value Date    WBC 6.5 06/20/2023    HGB 14.9 06/20/2023    HCT 45.7 06/20/2023    MCV 93 06/20/2023     06/20/2023     Lab Results   Component Value Date    LABA1C 7.7 (H) 08/16/2023    LABA1C 8.2 (H) 03/16/2023     Lab Results   Component Value Date    HDL 34 (L) 03/16/2023    LDLCALC 58 03/16/2023    CHOL 113 03/16/2023    TRIG 107 03/16/2023     No results found for: TESTM  No results found for: TSH, TSHREFLEX, FT3, T4FREE  No results found for: TPOABS    Review of Systems   Constitutional:  Negative for weight loss. Cardiovascular: Negative. Endocrine: Negative. Negative for polyuria. All other systems reviewed and are negative. Objective:   Physical Exam  Vitals reviewed. Constitutional:       General: He is not in acute distress. Appearance: Normal appearance. HENT:      Head: Normocephalic and atraumatic. Right Ear: External ear normal.      Left Ear: External ear normal.      Nose: Nose normal.   Eyes:      General: No scleral icterus. Right eye: No discharge. Left eye: No discharge. Extraocular Movements: Extraocular movements intact. Conjunctiva/sclera: Conjunctivae normal.   Cardiovascular:      Rate and Rhythm: Normal rate. Pulmonary:      Effort: Pulmonary effort is normal.   Musculoskeletal:         General: Normal range of motion. Cervical back: Normal range of motion and neck supple. Neurological:      General: No focal deficit present. Mental Status: He is alert and oriented to person, place, and time.    Psychiatric:         Mood and Affect: Mood normal.         Behavior: Behavior normal.

## 2023-09-02 ASSESSMENT — ENCOUNTER SYMPTOMS: VISUAL CHANGE: 0

## 2023-10-16 ENCOUNTER — LAB (OUTPATIENT)
Dept: LAB | Facility: LAB | Age: 68
End: 2023-10-16
Payer: MEDICARE

## 2023-10-16 DIAGNOSIS — N18.32 CHRONIC KIDNEY DISEASE, STAGE 3B (MULTI): Primary | ICD-10-CM

## 2023-10-16 DIAGNOSIS — N18.32 CHRONIC KIDNEY DISEASE, STAGE 3B (MULTI): ICD-10-CM

## 2023-10-16 DIAGNOSIS — I12.9 HYPERTENSIVE CHRONIC KIDNEY DISEASE WITH STAGE 1 THROUGH STAGE 4 CHRONIC KIDNEY DISEASE, OR UNSPECIFIED CHRONIC KIDNEY DISEASE: ICD-10-CM

## 2023-10-16 LAB
ANION GAP SERPL CALC-SCNC: 10 MMOL/L (ref 10–20)
BASOPHILS # BLD AUTO: 0.05 X10*3/UL (ref 0–0.1)
BASOPHILS NFR BLD AUTO: 0.9 %
BUN SERPL-MCNC: 30 MG/DL (ref 6–23)
CALCIUM SERPL-MCNC: 9.6 MG/DL (ref 8.6–10.3)
CHLORIDE SERPL-SCNC: 105 MMOL/L (ref 98–107)
CO2 SERPL-SCNC: 28 MMOL/L (ref 21–32)
CREAT SERPL-MCNC: 2.03 MG/DL (ref 0.5–1.3)
EOSINOPHIL # BLD AUTO: 0.19 X10*3/UL (ref 0–0.7)
EOSINOPHIL NFR BLD AUTO: 3.5 %
ERYTHROCYTE [DISTWIDTH] IN BLOOD BY AUTOMATED COUNT: 13.1 % (ref 11.5–14.5)
GFR SERPL CREATININE-BSD FRML MDRD: 35 ML/MIN/1.73M*2
GLUCOSE SERPL-MCNC: 255 MG/DL (ref 74–99)
HCT VFR BLD AUTO: 44.7 % (ref 41–52)
HGB BLD-MCNC: 14.5 G/DL (ref 13.5–17.5)
IMM GRANULOCYTES # BLD AUTO: 0.03 X10*3/UL (ref 0–0.7)
IMM GRANULOCYTES NFR BLD AUTO: 0.6 % (ref 0–0.9)
LYMPHOCYTES # BLD AUTO: 1.55 X10*3/UL (ref 1.2–4.8)
LYMPHOCYTES NFR BLD AUTO: 28.6 %
MCH RBC QN AUTO: 30.4 PG (ref 26–34)
MCHC RBC AUTO-ENTMCNC: 32.4 G/DL (ref 32–36)
MCV RBC AUTO: 94 FL (ref 80–100)
MONOCYTES # BLD AUTO: 0.41 X10*3/UL (ref 0.1–1)
MONOCYTES NFR BLD AUTO: 7.6 %
NEUTROPHILS # BLD AUTO: 3.19 X10*3/UL (ref 1.2–7.7)
NEUTROPHILS NFR BLD AUTO: 58.8 %
NRBC BLD-RTO: 0 /100 WBCS (ref 0–0)
PHOSPHATE SERPL-MCNC: 3.2 MG/DL (ref 2.5–4.9)
PLATELET # BLD AUTO: 158 X10*3/UL (ref 150–450)
PMV BLD AUTO: 12.1 FL (ref 7.5–11.5)
POTASSIUM SERPL-SCNC: 4.8 MMOL/L (ref 3.5–5.3)
PTH-INTACT SERPL-MCNC: 51.5 PG/ML (ref 18.5–88)
RBC # BLD AUTO: 4.77 X10*6/UL (ref 4.5–5.9)
SODIUM SERPL-SCNC: 138 MMOL/L (ref 136–145)
WBC # BLD AUTO: 5.4 X10*3/UL (ref 4.4–11.3)

## 2023-10-16 PROCEDURE — 36415 COLL VENOUS BLD VENIPUNCTURE: CPT

## 2023-10-16 PROCEDURE — 80048 BASIC METABOLIC PNL TOTAL CA: CPT

## 2023-10-16 PROCEDURE — 83970 ASSAY OF PARATHORMONE: CPT

## 2023-10-16 PROCEDURE — 85025 COMPLETE CBC W/AUTO DIFF WBC: CPT

## 2023-10-16 PROCEDURE — 84100 ASSAY OF PHOSPHORUS: CPT

## 2023-11-12 PROBLEM — R94.31 ABNORMAL EKG: Status: ACTIVE | Noted: 2023-11-12

## 2023-11-12 PROBLEM — E78.5 DYSLIPIDEMIA: Status: ACTIVE | Noted: 2023-11-12

## 2023-11-12 PROBLEM — R00.1 BRADYCARDIA: Status: ACTIVE | Noted: 2023-11-12

## 2023-11-12 PROBLEM — N39.0 ACUTE UTI: Status: ACTIVE | Noted: 2023-11-12

## 2023-11-12 PROBLEM — I10 PRIMARY HYPERTENSION: Status: ACTIVE | Noted: 2023-11-12

## 2023-11-12 PROBLEM — R73.9 HYPERGLYCEMIA: Status: ACTIVE | Noted: 2023-11-12

## 2023-11-12 PROBLEM — Z79.84 LONG TERM (CURRENT) USE OF ORAL HYPOGLYCEMIC DRUGS: Status: ACTIVE | Noted: 2021-12-02

## 2023-11-12 PROBLEM — N21.0 BLADDER CALCULUS: Status: ACTIVE | Noted: 2023-11-12

## 2023-11-12 PROBLEM — I21.3 STEMI (ST ELEVATION MYOCARDIAL INFARCTION) (MULTI): Status: ACTIVE | Noted: 2023-11-12

## 2023-11-12 PROBLEM — Z98.61 CAD S/P PERCUTANEOUS CORONARY ANGIOPLASTY: Status: ACTIVE | Noted: 2023-11-12

## 2023-11-12 PROBLEM — K21.9 GERD (GASTROESOPHAGEAL REFLUX DISEASE): Status: ACTIVE | Noted: 2023-11-12

## 2023-11-12 PROBLEM — J96.01 ACUTE RESPIRATORY FAILURE WITH HYPOXIA (MULTI): Status: ACTIVE | Noted: 2021-12-02

## 2023-11-12 PROBLEM — N20.0 NEPHROLITHIASIS: Status: ACTIVE | Noted: 2023-11-12

## 2023-11-12 PROBLEM — N18.31 CHRONIC KIDNEY DISEASE, STAGE 3A (MULTI): Status: ACTIVE | Noted: 2021-12-02

## 2023-11-12 PROBLEM — K92.2 GI BLEED: Status: ACTIVE | Noted: 2023-11-12

## 2023-11-12 PROBLEM — N17.9 ACUTE KIDNEY FAILURE, UNSPECIFIED (CMS-HCC): Status: ACTIVE | Noted: 2021-12-02

## 2023-11-12 PROBLEM — R35.1 NOCTURIA: Status: ACTIVE | Noted: 2023-11-12

## 2023-11-12 PROBLEM — Z99.81 DEPENDENCE ON SUPPLEMENTAL OXYGEN: Status: ACTIVE | Noted: 2021-12-02

## 2023-11-12 PROBLEM — I25.10 CAD S/P PERCUTANEOUS CORONARY ANGIOPLASTY: Status: ACTIVE | Noted: 2023-11-12

## 2023-11-12 PROBLEM — E11.22 TYPE 2 DIABETES MELLITUS WITH DIABETIC CHRONIC KIDNEY DISEASE (MULTI): Status: ACTIVE | Noted: 2021-12-02

## 2023-11-12 PROBLEM — Q61.5 MEDULLARY SPONGE KIDNEY: Status: ACTIVE | Noted: 2023-11-12

## 2023-11-12 PROBLEM — R82.89 ABNORMAL URINE CYTOLOGY: Status: ACTIVE | Noted: 2023-11-12

## 2023-11-12 PROBLEM — R55 SYNCOPE: Status: ACTIVE | Noted: 2023-11-12

## 2023-11-12 PROBLEM — Z86.79 H/O ORTHOSTATIC HYPOTENSION: Status: ACTIVE | Noted: 2023-11-12

## 2023-11-12 PROBLEM — I47.20 VT (VENTRICULAR TACHYCARDIA) (MULTI): Status: ACTIVE | Noted: 2023-11-12

## 2023-11-12 PROBLEM — R39.15 URINARY URGENCY: Status: ACTIVE | Noted: 2023-11-12

## 2023-11-12 RX ORDER — NITROGLYCERIN 0.4 MG/1
0.4 TABLET SUBLINGUAL EVERY 5 MIN PRN
COMMUNITY

## 2023-11-12 RX ORDER — PANTOPRAZOLE SODIUM 40 MG/1
40 TABLET, DELAYED RELEASE ORAL DAILY
Status: ON HOLD | COMMUNITY
Start: 2022-09-13 | End: 2024-05-23 | Stop reason: ALTCHOICE

## 2023-11-12 RX ORDER — LISINOPRIL 5 MG/1
TABLET ORAL
COMMUNITY
Start: 2023-07-24

## 2023-11-12 RX ORDER — ALBUTEROL SULFATE 90 UG/1
AEROSOL, METERED RESPIRATORY (INHALATION)
COMMUNITY
Start: 2021-12-20 | End: 2023-11-14 | Stop reason: ALTCHOICE

## 2023-11-12 RX ORDER — NAPROXEN SODIUM 220 MG/1
1 TABLET, FILM COATED ORAL DAILY
COMMUNITY

## 2023-11-12 RX ORDER — ATORVASTATIN CALCIUM 40 MG/1
1 TABLET, FILM COATED ORAL NIGHTLY
COMMUNITY
Start: 2022-04-19 | End: 2024-02-13

## 2023-11-12 RX ORDER — METOPROLOL TARTRATE 25 MG/1
0.5 TABLET, FILM COATED ORAL 2 TIMES DAILY
COMMUNITY
Start: 2022-05-23 | End: 2024-02-13

## 2023-11-12 RX ORDER — PEN NEEDLE, DIABETIC 32GX 5/32"
NEEDLE, DISPOSABLE MISCELLANEOUS
COMMUNITY
Start: 2023-10-01

## 2023-11-12 RX ORDER — INSULIN LISPRO 100 [IU]/ML
10 INJECTION, SUSPENSION SUBCUTANEOUS 2 TIMES DAILY
COMMUNITY
Start: 2022-11-16

## 2023-11-12 RX ORDER — CALCITRIOL 0.25 UG/1
0.25 CAPSULE ORAL
COMMUNITY

## 2023-11-14 ENCOUNTER — OFFICE VISIT (OUTPATIENT)
Dept: CARDIOLOGY | Facility: CLINIC | Age: 68
End: 2023-11-14
Payer: COMMERCIAL

## 2023-11-14 VITALS
BODY MASS INDEX: 24.23 KG/M2 | DIASTOLIC BLOOD PRESSURE: 64 MMHG | SYSTOLIC BLOOD PRESSURE: 118 MMHG | HEART RATE: 52 BPM | HEIGHT: 68 IN | WEIGHT: 159.9 LBS

## 2023-11-14 DIAGNOSIS — E11.22 TYPE 2 DIABETES MELLITUS WITH CHRONIC KIDNEY DISEASE, WITHOUT LONG-TERM CURRENT USE OF INSULIN, UNSPECIFIED CKD STAGE (MULTI): ICD-10-CM

## 2023-11-14 DIAGNOSIS — Z98.61 CAD S/P PERCUTANEOUS CORONARY ANGIOPLASTY: ICD-10-CM

## 2023-11-14 DIAGNOSIS — I21.3 ST ELEVATION MYOCARDIAL INFARCTION (STEMI), UNSPECIFIED ARTERY (MULTI): ICD-10-CM

## 2023-11-14 DIAGNOSIS — R09.89 BRUIT OF RIGHT CAROTID ARTERY: ICD-10-CM

## 2023-11-14 DIAGNOSIS — F17.200 SMOKER: ICD-10-CM

## 2023-11-14 DIAGNOSIS — Z86.79 H/O ORTHOSTATIC HYPOTENSION: ICD-10-CM

## 2023-11-14 DIAGNOSIS — I10 PRIMARY HYPERTENSION: ICD-10-CM

## 2023-11-14 DIAGNOSIS — F17.200 TOBACCO USE DISORDER: ICD-10-CM

## 2023-11-14 DIAGNOSIS — E78.5 DYSLIPIDEMIA: ICD-10-CM

## 2023-11-14 DIAGNOSIS — I25.10 CAD S/P PERCUTANEOUS CORONARY ANGIOPLASTY: ICD-10-CM

## 2023-11-14 PROCEDURE — 3008F BODY MASS INDEX DOCD: CPT | Performed by: INTERNAL MEDICINE

## 2023-11-14 PROCEDURE — 3051F HG A1C>EQUAL 7.0%<8.0%: CPT | Performed by: INTERNAL MEDICINE

## 2023-11-14 PROCEDURE — 3074F SYST BP LT 130 MM HG: CPT | Performed by: INTERNAL MEDICINE

## 2023-11-14 PROCEDURE — 3078F DIAST BP <80 MM HG: CPT | Performed by: INTERNAL MEDICINE

## 2023-11-14 PROCEDURE — 1159F MED LIST DOCD IN RCRD: CPT | Performed by: INTERNAL MEDICINE

## 2023-11-14 PROCEDURE — 1036F TOBACCO NON-USER: CPT | Performed by: INTERNAL MEDICINE

## 2023-11-14 PROCEDURE — 99214 OFFICE O/P EST MOD 30 MIN: CPT | Performed by: INTERNAL MEDICINE

## 2023-11-14 PROCEDURE — 4010F ACE/ARB THERAPY RXD/TAKEN: CPT | Performed by: INTERNAL MEDICINE

## 2023-11-14 RX ORDER — INSULIN LISPRO 100 [IU]/ML
INJECTION, SUSPENSION SUBCUTANEOUS
Qty: 135 ML | Refills: 3 | Status: SHIPPED | OUTPATIENT
Start: 2023-11-14

## 2023-11-14 RX ORDER — DAPAGLIFLOZIN 5 MG/1
5 TABLET, FILM COATED ORAL DAILY
COMMUNITY
Start: 2023-10-30 | End: 2024-05-21 | Stop reason: WASHOUT

## 2023-11-14 NOTE — PROGRESS NOTES
"Referred by Dr. Mcintosh ref. provider found provider found for   Chief Complaint   Patient presents with    Follow-up     9 month        History of Present Illness  Peyman Khanna is a 68 y.o. year old male patient with history of for previous coronary artery disease.  Status post remote coronary stenting.  Been doing well from a cardiac standpoint no complaint no symptoms of chest pain or shortness of breath.  He does have have what appears to be a right carotid bruit.  I discussed with the patient in length we will continue medication we will obtain carotid duplex scan will call results.  We will follow-up as    Past Medical History  Past Medical History:   Diagnosis Date    Personal history of other diseases of the digestive system     History of esophageal reflux    Personal history of other diseases of the digestive system     History of gastrointestinal hemorrhage       Social History  Social History     Tobacco Use    Smoking status: Former     Packs/day: 1.00     Years: 38.00     Additional pack years: 0.00     Total pack years: 38.00     Types: Cigarettes     Quit date:      Years since quittin.8    Smokeless tobacco: Never   Substance Use Topics    Alcohol use: Not Currently    Drug use: Never       Family History     Family History   Problem Relation Name Age of Onset    Diabetes Father      Emphysema Father      Cancer Father      Stroke Brother      Cancer Other          Multiple Family Members    Diabetes Other          Multiple Family Members       Review of Systems  As per HPI, all other systems reviewed and negative.    Allergies:  No Known Allergies     Outpatient Medications:  Current Outpatient Medications   Medication Instructions    aspirin 81 mg chewable tablet 1 tablet, oral, Daily    atorvastatin (Lipitor) 40 mg tablet 1 tablet, oral, Nightly    BD Brandi 2nd Gen Pen Needle 32 gauge x \" needle     calcitriol (ROCALTROL) 0.25 mcg, oral, Mon-Wed-Fri     Farxiga 5 mg, oral, Daily    insulin " lispro protamin-lispro (HumaLOG Mix 75-25) 100 unit/mL (75-25) injection 10 Units, subcutaneous, 2 times daily, For glucose more than 100<BR>    lisinopril 5 mg tablet     metoprolol tartrate (Lopressor) 25 mg tablet 0.5 tablets, oral, 2 times daily    nitroglycerin (NITROSTAT) 0.4 mg, sublingual, Every 5 min PRN, UP TO 3 DOSES    pantoprazole (PROTONIX) 40 mg, oral, Daily         Vitals:  Vitals:    11/14/23 1057   BP: 118/64   Pulse: 52       Physical Exam:  Physical Exam  Constitutional:       Appearance: Normal appearance.   HENT:      Head: Normocephalic and atraumatic.   Eyes:      Extraocular Movements: Extraocular movements intact.      Pupils: Pupils are equal, round, and reactive to light.   Neck:      Vascular: Carotid bruit present.      Comments: Right Brui  Cardiovascular:      Rate and Rhythm: Normal rate and regular rhythm.      Pulses: Normal pulses.      Heart sounds: Normal heart sounds.   Pulmonary:      Effort: Pulmonary effort is normal.      Breath sounds: Normal breath sounds.   Abdominal:      General: Abdomen is flat.      Palpations: Abdomen is soft.   Musculoskeletal:      Right lower leg: No edema.      Left lower leg: No edema.   Skin:     General: Skin is warm and dry.   Neurological:      General: No focal deficit present.      Mental Status: He is alert and oriented to person, place, and time.             Assessment/Plan   Diagnoses and all orders for this visit:  CAD S/P percutaneous coronary angioplasty  Primary hypertension  ST elevation myocardial infarction (STEMI), unspecified artery (CMS/[facility])  H/O orthostatic hypotension  Dyslipidemia  Type 2 diabetes mellitus with chronic kidney disease, without long-term current use of insulin, unspecified CKD stage (CMS/[facility])  Tobacco use disorder  Smoker  BMI 24.0-24.9, adult  Bruit of right carotid artery          Riri Aranda MD [facility]  Interventional Cardiology   of [facility]     Thank you for allowing me to  participate in the care of this patient. Please do not hesitate to contact me with any further questions or concerns.

## 2023-11-14 NOTE — PATIENT INSTRUCTIONS
Patient to follow up in 1 year with Dr. Riri Aranda MD [facility]    Office will arrange Carotid Ultrasound in near future- will call with results.     No other changes today.   Continue same medications and treatments.   Patient educated on proper medication use.   Patient educated on risk factor modification.   Please bring any lab results from other providers / physicians to your next appointment.     Please bring all medicines, vitamins, and herbal supplements with you when you come to the office.     Prescriptions will not be filled unless you are compliant with your follow up appointments or have a follow up appointment scheduled as per instruction of your physician. Refills should be requested at the time of your visit.    Rell BOURNE RN am scribing for and in the presence of Dr. Riri Aranda MD [facility]

## 2023-12-19 ENCOUNTER — HOSPITAL ENCOUNTER (OUTPATIENT)
Dept: CARDIOLOGY | Facility: CLINIC | Age: 68
Discharge: HOME | End: 2023-12-19
Payer: COMMERCIAL

## 2023-12-19 DIAGNOSIS — I25.10 CAD S/P PERCUTANEOUS CORONARY ANGIOPLASTY: ICD-10-CM

## 2023-12-19 DIAGNOSIS — R09.89 BRUIT OF RIGHT CAROTID ARTERY: ICD-10-CM

## 2023-12-19 DIAGNOSIS — Z98.61 CAD S/P PERCUTANEOUS CORONARY ANGIOPLASTY: ICD-10-CM

## 2023-12-19 DIAGNOSIS — E78.5 DYSLIPIDEMIA: ICD-10-CM

## 2023-12-19 DIAGNOSIS — I10 PRIMARY HYPERTENSION: ICD-10-CM

## 2023-12-19 PROCEDURE — 93880 EXTRACRANIAL BILAT STUDY: CPT

## 2023-12-19 PROCEDURE — 93880 EXTRACRANIAL BILAT STUDY: CPT | Performed by: INTERNAL MEDICINE

## 2024-01-02 RX ORDER — PEN NEEDLE, DIABETIC 32GX 5/32"
NEEDLE, DISPOSABLE MISCELLANEOUS
Qty: 180 EACH | Refills: 3 | Status: SHIPPED | OUTPATIENT
Start: 2024-01-02

## 2024-01-03 DIAGNOSIS — N20.0 NEPHROLITHIASIS: ICD-10-CM

## 2024-01-03 DIAGNOSIS — R31.9 HEMATURIA, UNSPECIFIED TYPE: ICD-10-CM

## 2024-01-17 ENCOUNTER — LAB (OUTPATIENT)
Dept: LAB | Facility: LAB | Age: 69
End: 2024-01-17
Payer: COMMERCIAL

## 2024-01-17 ENCOUNTER — ANCILLARY PROCEDURE (OUTPATIENT)
Dept: RADIOLOGY | Facility: CLINIC | Age: 69
End: 2024-01-17
Payer: COMMERCIAL

## 2024-01-17 DIAGNOSIS — N20.0 NEPHROLITHIASIS: ICD-10-CM

## 2024-01-17 DIAGNOSIS — R31.9 HEMATURIA, UNSPECIFIED TYPE: ICD-10-CM

## 2024-01-17 DIAGNOSIS — E11.9 TYPE 2 DIABETES MELLITUS WITHOUT COMPLICATIONS (MULTI): Primary | ICD-10-CM

## 2024-01-17 DIAGNOSIS — E78.00 PURE HYPERCHOLESTEROLEMIA, UNSPECIFIED: ICD-10-CM

## 2024-01-17 DIAGNOSIS — I10 ESSENTIAL (PRIMARY) HYPERTENSION: ICD-10-CM

## 2024-01-17 LAB
ALT SERPL W P-5'-P-CCNC: 17 U/L (ref 10–52)
ANION GAP SERPL CALC-SCNC: 12 MMOL/L (ref 10–20)
APPEARANCE UR: ABNORMAL
AST SERPL W P-5'-P-CCNC: 16 U/L (ref 9–39)
BILIRUB UR STRIP.AUTO-MCNC: NEGATIVE MG/DL
BUN SERPL-MCNC: 28 MG/DL (ref 6–23)
CALCIUM SERPL-MCNC: 9.7 MG/DL (ref 8.6–10.3)
CHLORIDE SERPL-SCNC: 105 MMOL/L (ref 98–107)
CHOLEST SERPL-MCNC: 121 MG/DL (ref 0–199)
CHOLESTEROL/HDL RATIO: 3.6
CO2 SERPL-SCNC: 26 MMOL/L (ref 21–32)
COLOR UR: YELLOW
CREAT SERPL-MCNC: 1.64 MG/DL (ref 0.5–1.3)
EGFRCR SERPLBLD CKD-EPI 2021: 44 ML/MIN/1.73M*2
EST. AVERAGE GLUCOSE BLD GHB EST-MCNC: 169 MG/DL
GLUCOSE SERPL-MCNC: 132 MG/DL (ref 74–99)
GLUCOSE SERPL-MCNC: 132 MG/DL (ref 74–99)
GLUCOSE UR STRIP.AUTO-MCNC: NEGATIVE MG/DL
HBA1C MFR BLD: 7.5 %
HDLC SERPL-MCNC: 33.2 MG/DL
HYALINE CASTS #/AREA URNS AUTO: ABNORMAL /LPF
KETONES UR STRIP.AUTO-MCNC: NEGATIVE MG/DL
LDLC SERPL CALC-MCNC: 70 MG/DL
LDLC SERPL DIRECT ASSAY-MCNC: 72 MG/DL (ref 0–129)
LEUKOCYTE ESTERASE UR QL STRIP.AUTO: ABNORMAL
MUCOUS THREADS #/AREA URNS AUTO: ABNORMAL /LPF
NITRITE UR QL STRIP.AUTO: NEGATIVE
NON HDL CHOLESTEROL: 88 MG/DL (ref 0–149)
PH UR STRIP.AUTO: 5 [PH]
POTASSIUM SERPL-SCNC: 4.3 MMOL/L (ref 3.5–5.3)
PROT UR STRIP.AUTO-MCNC: ABNORMAL MG/DL
RBC # UR STRIP.AUTO: NEGATIVE /UL
RBC #/AREA URNS AUTO: ABNORMAL /HPF
SODIUM SERPL-SCNC: 139 MMOL/L (ref 136–145)
SP GR UR STRIP.AUTO: 1.02
SQUAMOUS #/AREA URNS AUTO: ABNORMAL /HPF
TRIGL SERPL-MCNC: 87 MG/DL (ref 0–149)
UROBILINOGEN UR STRIP.AUTO-MCNC: <2 MG/DL
VLDL: 17 MG/DL (ref 0–40)
WBC #/AREA URNS AUTO: >50 /HPF

## 2024-01-17 PROCEDURE — 83036 HEMOGLOBIN GLYCOSYLATED A1C: CPT

## 2024-01-17 PROCEDURE — 83721 ASSAY OF BLOOD LIPOPROTEIN: CPT

## 2024-01-17 PROCEDURE — 84450 TRANSFERASE (AST) (SGOT): CPT

## 2024-01-17 PROCEDURE — 84460 ALANINE AMINO (ALT) (SGPT): CPT

## 2024-01-17 PROCEDURE — 74176 CT ABD & PELVIS W/O CONTRAST: CPT

## 2024-01-17 PROCEDURE — 80061 LIPID PANEL: CPT

## 2024-01-17 PROCEDURE — 74176 CT ABD & PELVIS W/O CONTRAST: CPT | Performed by: RADIOLOGY

## 2024-01-17 PROCEDURE — 36415 COLL VENOUS BLD VENIPUNCTURE: CPT

## 2024-01-17 PROCEDURE — 80048 BASIC METABOLIC PNL TOTAL CA: CPT

## 2024-01-17 PROCEDURE — 82947 ASSAY GLUCOSE BLOOD QUANT: CPT

## 2024-01-17 PROCEDURE — 81001 URINALYSIS AUTO W/SCOPE: CPT

## 2024-01-23 ENCOUNTER — TELEPHONE (OUTPATIENT)
Dept: UROLOGY | Facility: CLINIC | Age: 69
End: 2024-01-23
Payer: COMMERCIAL

## 2024-01-23 DIAGNOSIS — N20.1 URETERAL CALCULI: ICD-10-CM

## 2024-01-23 NOTE — TELEPHONE ENCOUNTER
----- Message from Jose Red MD sent at 1/19/2024  8:28 PM EST -----  Regarding: Abnormal CAT scan  Crystal, please call patient and inform that the renal colic CAT scan identifies a 7 mm right distal ureteral calculus.  He should obtain a creatinine and CT urogram prior to his February 2, 2024 follow-up visit.  ----- Message -----  From: Interface, Radiology Results In  Sent: 1/18/2024   9:59 AM EST  To: Jose Red MD

## 2024-01-25 DIAGNOSIS — E11.65 TYPE 2 DIABETES MELLITUS WITH HYPERGLYCEMIA, WITHOUT LONG-TERM CURRENT USE OF INSULIN (HCC): ICD-10-CM

## 2024-01-25 LAB
ANION GAP SERPL CALCULATED.3IONS-SCNC: 10 MEQ/L (ref 9–15)
BUN SERPL-MCNC: 22 MG/DL (ref 8–23)
CALCIUM SERPL-MCNC: 9.5 MG/DL (ref 8.5–9.9)
CHLORIDE SERPL-SCNC: 104 MEQ/L (ref 95–107)
CO2 SERPL-SCNC: 24 MEQ/L (ref 20–31)
CREAT SERPL-MCNC: 2.28 MG/DL (ref 0.7–1.2)
GLUCOSE SERPL-MCNC: 87 MG/DL (ref 70–99)
HBA1C MFR BLD: 7.9 % (ref 4.8–5.9)
POTASSIUM SERPL-SCNC: 4.6 MEQ/L (ref 3.4–4.9)
SODIUM SERPL-SCNC: 138 MEQ/L (ref 135–144)

## 2024-02-02 ENCOUNTER — APPOINTMENT (OUTPATIENT)
Dept: UROLOGY | Facility: CLINIC | Age: 69
End: 2024-02-02
Payer: COMMERCIAL

## 2024-02-12 DIAGNOSIS — E78.5 DYSLIPIDEMIA: ICD-10-CM

## 2024-02-12 DIAGNOSIS — I10 PRIMARY HYPERTENSION: ICD-10-CM

## 2024-02-13 RX ORDER — METOPROLOL TARTRATE 25 MG/1
25 TABLET, FILM COATED ORAL DAILY
Qty: 100 TABLET | Refills: 3 | Status: SHIPPED | OUTPATIENT
Start: 2024-02-13

## 2024-02-13 RX ORDER — ATORVASTATIN CALCIUM 40 MG/1
40 TABLET, FILM COATED ORAL NIGHTLY
Qty: 100 TABLET | Refills: 3 | Status: SHIPPED | OUTPATIENT
Start: 2024-02-13

## 2024-02-19 ENCOUNTER — OFFICE VISIT (OUTPATIENT)
Dept: URBAN - METROPOLITAN AREA CLINIC 63 | Facility: CLINIC | Age: 69
End: 2024-02-19
Payer: COMMERCIAL

## 2024-02-19 DIAGNOSIS — E11.9 TYPE 2 DIABETES MELLITUS W/O COMPLICATION: ICD-10-CM

## 2024-02-19 DIAGNOSIS — H25.813 COMBINED FORMS OF AGE-RELATED CATARACT, BILATERAL: Primary | ICD-10-CM

## 2024-02-19 PROCEDURE — 92014 COMPRE OPH EXAM EST PT 1/>: CPT | Performed by: OPHTHALMOLOGY

## 2024-02-22 ENCOUNTER — OFFICE VISIT (OUTPATIENT)
Dept: ENDOCRINOLOGY | Age: 69
End: 2024-02-22
Payer: MEDICARE

## 2024-02-22 VITALS
WEIGHT: 155 LBS | BODY MASS INDEX: 23.49 KG/M2 | HEART RATE: 82 BPM | SYSTOLIC BLOOD PRESSURE: 113 MMHG | OXYGEN SATURATION: 97 % | HEIGHT: 68 IN | DIASTOLIC BLOOD PRESSURE: 67 MMHG

## 2024-02-22 DIAGNOSIS — N18.30 STAGE 3 CHRONIC KIDNEY DISEASE, UNSPECIFIED WHETHER STAGE 3A OR 3B CKD (HCC): ICD-10-CM

## 2024-02-22 DIAGNOSIS — E11.65 TYPE 2 DIABETES MELLITUS WITH HYPERGLYCEMIA, WITHOUT LONG-TERM CURRENT USE OF INSULIN (HCC): Primary | ICD-10-CM

## 2024-02-22 LAB
CHP ED QC CHECK: NORMAL
GLUCOSE BLD-MCNC: 132 MG/DL

## 2024-02-22 PROCEDURE — 3017F COLORECTAL CA SCREEN DOC REV: CPT | Performed by: INTERNAL MEDICINE

## 2024-02-22 PROCEDURE — 99213 OFFICE O/P EST LOW 20 MIN: CPT | Performed by: INTERNAL MEDICINE

## 2024-02-22 PROCEDURE — G8484 FLU IMMUNIZE NO ADMIN: HCPCS | Performed by: INTERNAL MEDICINE

## 2024-02-22 PROCEDURE — G8420 CALC BMI NORM PARAMETERS: HCPCS | Performed by: INTERNAL MEDICINE

## 2024-02-22 PROCEDURE — 2022F DILAT RTA XM EVC RTNOPTHY: CPT | Performed by: INTERNAL MEDICINE

## 2024-02-22 PROCEDURE — 82962 GLUCOSE BLOOD TEST: CPT | Performed by: INTERNAL MEDICINE

## 2024-02-22 PROCEDURE — 1036F TOBACCO NON-USER: CPT | Performed by: INTERNAL MEDICINE

## 2024-02-22 PROCEDURE — 1123F ACP DISCUSS/DSCN MKR DOCD: CPT | Performed by: INTERNAL MEDICINE

## 2024-02-22 PROCEDURE — 3051F HG A1C>EQUAL 7.0%<8.0%: CPT | Performed by: INTERNAL MEDICINE

## 2024-02-22 PROCEDURE — G8427 DOCREV CUR MEDS BY ELIG CLIN: HCPCS | Performed by: INTERNAL MEDICINE

## 2024-02-22 RX ORDER — NITROGLYCERIN 0.4 MG/1
0.4 TABLET SUBLINGUAL EVERY 5 MIN PRN
COMMUNITY

## 2024-02-22 ASSESSMENT — ENCOUNTER SYMPTOMS: EYES NEGATIVE: 1

## 2024-02-22 NOTE — PROGRESS NOTES
Rhythm: Normal rate.   Pulmonary:      Effort: Pulmonary effort is normal.   Musculoskeletal:         General: Normal range of motion.      Cervical back: Normal range of motion and neck supple.   Neurological:      General: No focal deficit present.      Mental Status: He is alert and oriented to person, place, and time.   Psychiatric:         Mood and Affect: Mood normal.         Behavior: Behavior normal.

## 2024-02-23 ENCOUNTER — APPOINTMENT (OUTPATIENT)
Dept: RADIOLOGY | Facility: CLINIC | Age: 69
End: 2024-02-23
Payer: COMMERCIAL

## 2024-02-23 ENCOUNTER — LAB (OUTPATIENT)
Dept: LAB | Facility: LAB | Age: 69
End: 2024-02-23
Payer: COMMERCIAL

## 2024-02-23 DIAGNOSIS — I12.9 HYPERTENSIVE CHRONIC KIDNEY DISEASE WITH STAGE 1 THROUGH STAGE 4 CHRONIC KIDNEY DISEASE, OR UNSPECIFIED CHRONIC KIDNEY DISEASE: ICD-10-CM

## 2024-02-23 DIAGNOSIS — N18.32 CHRONIC KIDNEY DISEASE, STAGE 3B (MULTI): Primary | ICD-10-CM

## 2024-02-23 LAB
ANION GAP SERPL CALC-SCNC: 12 MMOL/L (ref 10–20)
BASOPHILS # BLD AUTO: 0.05 X10*3/UL (ref 0–0.1)
BASOPHILS NFR BLD AUTO: 0.9 %
BUN SERPL-MCNC: 22 MG/DL (ref 6–23)
CALCIUM SERPL-MCNC: 9.4 MG/DL (ref 8.6–10.3)
CHLORIDE SERPL-SCNC: 106 MMOL/L (ref 98–107)
CO2 SERPL-SCNC: 28 MMOL/L (ref 21–32)
CREAT SERPL-MCNC: 1.94 MG/DL (ref 0.5–1.3)
EGFRCR SERPLBLD CKD-EPI 2021: 36 ML/MIN/1.73M*2
EOSINOPHIL # BLD AUTO: 0.21 X10*3/UL (ref 0–0.4)
EOSINOPHIL NFR BLD AUTO: 3.9 %
ERYTHROCYTE [DISTWIDTH] IN BLOOD BY AUTOMATED COUNT: 13.8 % (ref 11.5–14.5)
GLUCOSE SERPL-MCNC: 140 MG/DL (ref 74–99)
HCT VFR BLD AUTO: 43.8 % (ref 41–52)
HGB BLD-MCNC: 14.1 G/DL (ref 13.5–17.5)
IMM GRANULOCYTES # BLD AUTO: 0.04 X10*3/UL (ref 0–0.5)
IMM GRANULOCYTES NFR BLD AUTO: 0.7 % (ref 0–0.9)
LYMPHOCYTES # BLD AUTO: 1.65 X10*3/UL (ref 0.8–3)
LYMPHOCYTES NFR BLD AUTO: 30.3 %
MCH RBC QN AUTO: 30.7 PG (ref 26–34)
MCHC RBC AUTO-ENTMCNC: 32.2 G/DL (ref 32–36)
MCV RBC AUTO: 95 FL (ref 80–100)
MONOCYTES # BLD AUTO: 0.58 X10*3/UL (ref 0.05–0.8)
MONOCYTES NFR BLD AUTO: 10.6 %
NEUTROPHILS # BLD AUTO: 2.92 X10*3/UL (ref 1.6–5.5)
NEUTROPHILS NFR BLD AUTO: 53.6 %
NRBC BLD-RTO: 0 /100 WBCS (ref 0–0)
PHOSPHATE SERPL-MCNC: 3.7 MG/DL (ref 2.5–4.9)
PLATELET # BLD AUTO: 188 X10*3/UL (ref 150–450)
POTASSIUM SERPL-SCNC: 4.9 MMOL/L (ref 3.5–5.3)
PTH-INTACT SERPL-MCNC: 131 PG/ML (ref 18.5–88)
RBC # BLD AUTO: 4.6 X10*6/UL (ref 4.5–5.9)
SODIUM SERPL-SCNC: 141 MMOL/L (ref 136–145)
WBC # BLD AUTO: 5.5 X10*3/UL (ref 4.4–11.3)

## 2024-02-23 PROCEDURE — 83970 ASSAY OF PARATHORMONE: CPT

## 2024-02-23 PROCEDURE — 85025 COMPLETE CBC W/AUTO DIFF WBC: CPT

## 2024-02-23 PROCEDURE — 84100 ASSAY OF PHOSPHORUS: CPT

## 2024-02-23 PROCEDURE — 80048 BASIC METABOLIC PNL TOTAL CA: CPT

## 2024-03-15 ENCOUNTER — OFFICE VISIT (OUTPATIENT)
Dept: UROLOGY | Facility: CLINIC | Age: 69
End: 2024-03-15
Payer: COMMERCIAL

## 2024-03-15 VITALS
HEIGHT: 68 IN | WEIGHT: 149.91 LBS | BODY MASS INDEX: 22.72 KG/M2 | SYSTOLIC BLOOD PRESSURE: 106 MMHG | DIASTOLIC BLOOD PRESSURE: 68 MMHG | RESPIRATION RATE: 16 BRPM | HEART RATE: 97 BPM

## 2024-03-15 DIAGNOSIS — R31.9 HEMATURIA, UNSPECIFIED TYPE: ICD-10-CM

## 2024-03-15 DIAGNOSIS — Z12.5 SCREENING PSA (PROSTATE SPECIFIC ANTIGEN): ICD-10-CM

## 2024-03-15 DIAGNOSIS — R82.89 ABNORMAL URINE CYTOLOGY: ICD-10-CM

## 2024-03-15 DIAGNOSIS — N20.1 CALCULUS OF URETER: Primary | ICD-10-CM

## 2024-03-15 PROCEDURE — 3008F BODY MASS INDEX DOCD: CPT | Performed by: UROLOGY

## 2024-03-15 PROCEDURE — 1126F AMNT PAIN NOTED NONE PRSNT: CPT | Performed by: UROLOGY

## 2024-03-15 PROCEDURE — 3051F HG A1C>EQUAL 7.0%<8.0%: CPT | Performed by: UROLOGY

## 2024-03-15 PROCEDURE — 3078F DIAST BP <80 MM HG: CPT | Performed by: UROLOGY

## 2024-03-15 PROCEDURE — 3074F SYST BP LT 130 MM HG: CPT | Performed by: UROLOGY

## 2024-03-15 PROCEDURE — 4010F ACE/ARB THERAPY RXD/TAKEN: CPT | Performed by: UROLOGY

## 2024-03-15 PROCEDURE — 3048F LDL-C <100 MG/DL: CPT | Performed by: UROLOGY

## 2024-03-15 PROCEDURE — 1159F MED LIST DOCD IN RCRD: CPT | Performed by: UROLOGY

## 2024-03-15 PROCEDURE — 1036F TOBACCO NON-USER: CPT | Performed by: UROLOGY

## 2024-03-15 PROCEDURE — 99214 OFFICE O/P EST MOD 30 MIN: CPT | Performed by: UROLOGY

## 2024-03-15 ASSESSMENT — ENCOUNTER SYMPTOMS
FREQUENCY: 0
DYSURIA: 0
HEMATURIA: 0
DIFFICULTY URINATING: 0

## 2024-03-15 ASSESSMENT — PAIN SCALES - GENERAL: PAINLEVEL: 0-NO PAIN

## 2024-03-15 NOTE — PROGRESS NOTES
"  Provider Impressions     1/18/2016 established patient returns for follow-up. Patient initially seen in May 2010. Mr. Khanna is a a 64 year-old white male, he has a long history of NEPHROLITHIASIS with a significant history of NONCOMPLIANCE. January 2015 patient was seen at [facility] with severe HYDRONEPHROSIS bilaterally, bilateral OBSTRUCTING URETERAL CALCULI and a STAGHORN RENAL CALCULI. Patient went to the OR for placement of left and right URETERAL STENTS and STONE MANIPULATION. The patient then went main [facility] and per Dr. Taylor: The patient is an  at [employer]. He has no family history of prostate or breast cancer. He has a 40-pack-year cigarette smoking history. He presently smokes one pack of cigarettes per day.     Left distal URETERAL STONE 1.3 cm treated with URETEROSCOPY 2/24/15  Wih CYSTOLITHOLAPAXY 2/24/15  Right . PERCUTANEOUS NEPHROLITHOTOMY with cyberwand and antetgrade ureteroscopy and LL and basketing of impacted distal right ureteral stone 3/10/15  UTI with Patient on IV antibiotics through PIC line finished  Bilateral JJ stents removed today without difficulty     01/18/17, patient states on rare occasions he experiences \"twinges\" in his right kidney. Renal colic CAT scan shows bilateral, 3 mm nonobstructing STONES. Urinalysis, urine culture are both negative. Urine CYTOLOGY is ATYPICAL. PSA is 2.4. He will return in 1 year.     01/17/18, patient states he has occasional discomfort in his right kidney. Renal colic CAT scan is unchanged showing bilateral 3 mm nonobstructing STONES. The majority urine the left lower pole. Urinalysis and urine culture are negative as well as urine cytology. PSA is 3.3. He will return in 1 year.     01/29/19, patient states that he has passed 3 small stones over the last year. His renal colic CAT scan shows bilateral STONES, the largest being 6 mm in the left kidney. We will proceed with an IVP to confirm size and location. If greater than 5 mm, the " patient strongly wishes to pursue ESWL and not have an obstructing stone again. Urinalysis is negative. PSA is stable at 3.3.     03/09/19, patient is complaining of right-sided flank pain. However the KUB reveals a 3 mm stones on the right side and a 6 mm stone on the left. His creatinine was 1.6 and he could not undergo an IVP. We will schedule ESWL of his left renal calculus. Nurse practitioner Juan David will provide preoperative medical risk stratification. His primary care physician, Dr. Doty is no longer with the practice.     03/28/19, ESWL of a 0.7 cm left mid renal calculus.     CALCIUM OXALATE stones     05/02/19, OR, cystoscopy, removal of left double-J ureteral stent, left retropyelogram, left ureteroscopy     12/20/19, MI, 3 cardiac stents     BRILINTA     01/10/20, BIRTHDAY BOY, patient arrives alone. He is recovering from his MI and 3 recent stent placements. No complaints of flank pain. Urinalysis and urine culture are normal. Renal colic CAT scan shows a 2 mm stone in the left kidney. PSA 2.10     October 2020, hospitalized for GI bleed     01/08/21, patient arrives alone. He states that he intermittently passes a small 2 or 3 mm stone every 3 months. He has no complaints of pain. Urinalysis, urine culture and urine cytology are all negative. PSA is normal at 1.80. He has bilateral 3 mm stones in the kidneys as seen on his renal colic CAT scan. Also a 1.8 cm left adrenal nodule. He will return in 1 year.     December 2 to December 21, 2021, admitted to [facility] with Covid pneumonia.     March 8, 2022, telephone call, renal ultrasound shows a 7 mm, 4 mm and 6 mm right renal calculus. 6 mm left renal calculus. 8 mm bladder stone. He will obtain a renal colic CAT scan and KUB     March 16, 2022, patient arrives alone. He has intermittent right flank pain. None renal colic CAT scan and KUB reveals a 4 mm stone in the bladder which the patient states he is passed. Also stones in the left kidney measuring 2  to 3 mm and in the right kidney 3 to 4 mm. He wishes to continue to observe and I agree with that plan. Urinalysis and urine culture were negative. He will return in 1 year.     February 1, 2023, patient arrives alone. He recently passed a stone following his renal colic CAT scan. Urinalysis no blood. Urine culture no growth. Renal colic CAT scan shows a 3 mm stone in the right kidney and a 3 mm stone in the left kidney, both nonobstructing. There was a 5 mm bladder calculus which evidently he has passed. He will return in 1 year.    January 23, 2024, telephone call, renal colic CAT scan identifies a 7 mm right distal ureteral calculus.  We will obtain a creatinine and CT urogram prior to his follow-up visit.    February 9, 2024, CT urogram denied by insurance.  Last years renal colic CAT scan showed a 5 mm distal ureteral calculus.    March 15, 2024, patient arrives alone.  He states that he recently passed a stone.  He also did not obtain his urine studies or PSA.  I am reluctant to believe that he passed this 8 mm stone without any discomfort.  However I am unwilling to schedule surgery unless I have confirmation.  The CT urogram would have provided that confirmation however it was denied by his insurance.  Therefore we will repeat the renal colic CAT scan now and if the stone still remains in the distal right ureter with hydronephrosis, then we will proceed with ureteroscopy and laser lithotripsy.  He will also obtain his urine studies and PSA.     PLAN:     #1 patient will return in 1 month with urine studies, PSA and renal colic CAT scan.  If the stone in the distal right ureter still present, then we will schedule laser lithotripsy.    January 2025 with renal colic CT, urine studies, and PSA.    Physical Exam  Vitals and nursing note reviewed.   Constitutional:       Appearance: Normal appearance.   HENT:      Head: Normocephalic and atraumatic.   Pulmonary:      Effort: Pulmonary effort is normal.    Abdominal:      Palpations: Abdomen is soft.      Tenderness: There is no abdominal tenderness.   Musculoskeletal:         General: Normal range of motion.      Cervical back: Normal range of motion and neck supple.   Neurological:      General: No focal deficit present.      Mental Status: He is alert and oriented to person, place, and time.   Psychiatric:         Mood and Affect: Mood normal.         Behavior: Behavior normal.        This note was created with voice recognition software and was not corrected for typographical or grammatical errors

## 2024-03-15 NOTE — PATIENT INSTRUCTIONS
Patient Discussion/Summary     It was very nice to see you again. Today we reviewed your testing, Your Cat scan reveals a 8 mm stone in lower portion of the right ureter.. Urine testing was not performed.  We had ordered a CT urogram for further information however this was denied by your insurance.  You state that you recently passed a stone and would like confirmation that the stone has either passed or is still there before deciding on proceeding with surgical intervention.      This note was created with voice-recognition software and was not corrected for typographical or grammatical errors.

## 2024-03-15 NOTE — LETTER
"March 15, 2024     Moisés Owusu DO  [address]  [facility] [address]    Patient: Peyman Khanna   YOB: 1955   Date of Visit: 3/15/2024       Dear Dr. Moisés Owusu DO:    Thank you for referring Peyman Khanna to me for evaluation. Below are my notes for this consultation.  If you have questions, please do not hesitate to call me. I look forward to following your patient along with you.       Sincerely,     Jose Red MD      CC: No Recipients  ______________________________________________________________________________________      Provider Impressions     1/18/2016 established patient returns for follow-up. Patient initially seen in May 2010. Mr. Khanna is a a 64 year-old white male, he has a long history of NEPHROLITHIASIS with a significant history of NONCOMPLIANCE. January 2015 patient was seen at [facility] with severe HYDRONEPHROSIS bilaterally, bilateral OBSTRUCTING URETERAL CALCULI and a STAGHORN RENAL CALCULI. Patient went to the OR for placement of left and right URETERAL STENTS and STONE MANIPULATION. The patient then went main campus  and per Dr. Taylor: The patient is an  at [employer]. He has no family history of prostate or breast cancer. He has a 40-pack-year cigarette smoking history. He presently smokes one pack of cigarettes per day.     Left distal URETERAL STONE 1.3 cm treated with URETEROSCOPY 2/24/15  Wih CYSTOLITHOLAPAXY 2/24/15  Right . PERCUTANEOUS NEPHROLITHOTOMY with cyberwand and antetgrade ureteroscopy and LL and basketing of impacted distal right ureteral stone 3/10/15  UTI with Patient on IV antibiotics through PIC line finished  Bilateral JJ stents removed today without difficulty     01/18/17, patient states on rare occasions he experiences \"twinges\" in his right kidney. Renal colic CAT scan shows bilateral, 3 mm nonobstructing STONES. Urinalysis, urine culture are both negative. Urine CYTOLOGY is ATYPICAL. PSA is 2.4. He will return in 1 " year.     01/17/18, patient states he has occasional discomfort in his right kidney. Renal colic CAT scan is unchanged showing bilateral 3 mm nonobstructing STONES. The majority urine the left lower pole. Urinalysis and urine culture are negative as well as urine cytology. PSA is 3.3. He will return in 1 year.     01/29/19, patient states that he has passed 3 small stones over the last year. His renal colic CAT scan shows bilateral STONES, the largest being 6 mm in the left kidney. We will proceed with an IVP to confirm size and location. If greater than 5 mm, the patient strongly wishes to pursue ESWL and not have an obstructing stone again. Urinalysis is negative. PSA is stable at 3.3.     03/09/19, patient is complaining of right-sided flank pain. However the KUB reveals a 3 mm stones on the right side and a 6 mm stone on the left. His creatinine was 1.6 and he could not undergo an IVP. We will schedule ESWL of his left renal calculus. Nurse practitioner Juan David will provide preoperative medical risk stratification. His primary care physician, Dr. Doty is no longer with the practice.     03/28/19, ESWL of a 0.7 cm left mid renal calculus.     CALCIUM OXALATE stones     05/02/19, OR, cystoscopy, removal of left double-J ureteral stent, left retropyelogram, left ureteroscopy     12/20/19, MI, 3 cardiac stents     BRILINTA     01/10/20, BIRTHDAY BOY, patient arrives alone. He is recovering from his MI and 3 recent stent placements. No complaints of flank pain. Urinalysis and urine culture are normal. Renal colic CAT scan shows a 2 mm stone in the left kidney. PSA 2.10     October 2020, hospitalized for GI bleed     01/08/21, patient arrives alone. He states that he intermittently passes a small 2 or 3 mm stone every 3 months. He has no complaints of pain. Urinalysis, urine culture and urine cytology are all negative. PSA is normal at 1.80. He has bilateral 3 mm stones in the kidneys as seen on his renal colic CAT  scan. Also a 1.8 cm left adrenal nodule. He will return in 1 year.     December 2 to December 21, 2021, admitted to [facility] with Covid pneumonia.     March 8, 2022, telephone call, renal ultrasound shows a 7 mm, 4 mm and 6 mm right renal calculus. 6 mm left renal calculus. 8 mm bladder stone. He will obtain a renal colic CAT scan and KUB     March 16, 2022, patient arrives alone. He has intermittent right flank pain. None renal colic CAT scan and KUB reveals a 4 mm stone in the bladder which the patient states he is passed. Also stones in the left kidney measuring 2 to 3 mm and in the right kidney 3 to 4 mm. He wishes to continue to observe and I agree with that plan. Urinalysis and urine culture were negative. He will return in 1 year.     February 1, 2023, patient arrives alone. He recently passed a stone following his renal colic CAT scan. Urinalysis no blood. Urine culture no growth. Renal colic CAT scan shows a 3 mm stone in the right kidney and a 3 mm stone in the left kidney, both nonobstructing. There was a 5 mm bladder calculus which evidently he has passed. He will return in 1 year.    January 23, 2024, telephone call, renal colic CAT scan identifies a 7 mm right distal ureteral calculus.  We will obtain a creatinine and CT urogram prior to his follow-up visit.    February 9, 2024, CT urogram denied by insurance.  Last years renal colic CAT scan showed a 5 mm distal ureteral calculus.    March 15, 2024, patient arrives alone.  He states that he recently passed a stone.  He also did not obtain his urine studies or PSA.  I am reluctant to believe that he passed this 8 mm stone without any discomfort.  However I am unwilling to schedule surgery unless I have confirmation.  The CT urogram would have provided that confirmation however it was denied by his insurance.  Therefore we will repeat the renal colic CAT scan now and if the stone still remains in the distal right ureter with hydronephrosis, then we will  proceed with ureteroscopy and laser lithotripsy.  He will also obtain his urine studies and PSA.     PLAN:     #1 patient will return in 1 month with urine studies, PSA and renal colic CAT scan.  If the stone in the distal right ureter still present, then we will schedule laser lithotripsy.    January 2025 with renal colic CT, urine studies, and PSA.    Physical Exam  Vitals and nursing note reviewed.   Constitutional:       Appearance: Normal appearance.   HENT:      Head: Normocephalic and atraumatic.   Pulmonary:      Effort: Pulmonary effort is normal.   Abdominal:      Palpations: Abdomen is soft.      Tenderness: There is no abdominal tenderness.   Musculoskeletal:         General: Normal range of motion.      Cervical back: Normal range of motion and neck supple.   Neurological:      General: No focal deficit present.      Mental Status: He is alert and oriented to person, place, and time.   Psychiatric:         Mood and Affect: Mood normal.         Behavior: Behavior normal.        This note was created with voice recognition software and was not corrected for typographical or grammatical errors

## 2024-03-15 NOTE — PROGRESS NOTES
Patient denies any pain today. Patient has not had any recent surgeries or hospital admits. Patient denies any concerns about falling or safety. Patient has occasional stop/stop flow. CV     Review of Systems   Genitourinary:  Positive for decreased urine volume. Negative for difficulty urinating, dysuria, enuresis, frequency, hematuria and urgency.   All other systems reviewed and are negative.

## 2024-04-01 ENCOUNTER — HOSPITAL ENCOUNTER (OUTPATIENT)
Dept: RADIOLOGY | Facility: CLINIC | Age: 69
Discharge: HOME | End: 2024-04-01
Payer: COMMERCIAL

## 2024-04-01 ENCOUNTER — LAB (OUTPATIENT)
Dept: LAB | Facility: LAB | Age: 69
End: 2024-04-01
Payer: COMMERCIAL

## 2024-04-01 DIAGNOSIS — R31.9 HEMATURIA, UNSPECIFIED TYPE: ICD-10-CM

## 2024-04-01 DIAGNOSIS — Z12.5 SCREENING PSA (PROSTATE SPECIFIC ANTIGEN): ICD-10-CM

## 2024-04-01 LAB
APPEARANCE UR: ABNORMAL
BACTERIA #/AREA URNS AUTO: ABNORMAL /HPF
BILIRUB UR STRIP.AUTO-MCNC: NEGATIVE MG/DL
COLOR UR: YELLOW
GLUCOSE UR STRIP.AUTO-MCNC: NEGATIVE MG/DL
HYALINE CASTS #/AREA URNS AUTO: ABNORMAL /LPF
KETONES UR STRIP.AUTO-MCNC: NEGATIVE MG/DL
LEUKOCYTE ESTERASE UR QL STRIP.AUTO: ABNORMAL
MUCOUS THREADS #/AREA URNS AUTO: ABNORMAL /LPF
NITRITE UR QL STRIP.AUTO: NEGATIVE
PH UR STRIP.AUTO: 5 [PH]
PROT UR STRIP.AUTO-MCNC: ABNORMAL MG/DL
PSA SERPL-MCNC: 2 NG/ML
RBC # UR STRIP.AUTO: NEGATIVE /UL
RBC #/AREA URNS AUTO: ABNORMAL /HPF
SP GR UR STRIP.AUTO: 1.02
UROBILINOGEN UR STRIP.AUTO-MCNC: <2 MG/DL
WBC #/AREA URNS AUTO: >50 /HPF
WBC CLUMPS #/AREA URNS AUTO: ABNORMAL /HPF

## 2024-04-01 PROCEDURE — 87186 SC STD MICRODIL/AGAR DIL: CPT

## 2024-04-01 PROCEDURE — 87086 URINE CULTURE/COLONY COUNT: CPT

## 2024-04-01 PROCEDURE — 74176 CT ABD & PELVIS W/O CONTRAST: CPT

## 2024-04-01 PROCEDURE — 84153 ASSAY OF PSA TOTAL: CPT

## 2024-04-01 PROCEDURE — 81001 URINALYSIS AUTO W/SCOPE: CPT

## 2024-04-01 PROCEDURE — 88112 CYTOPATH CELL ENHANCE TECH: CPT | Performed by: PATHOLOGY

## 2024-04-01 PROCEDURE — 88112 CYTOPATH CELL ENHANCE TECH: CPT

## 2024-04-01 PROCEDURE — 36415 COLL VENOUS BLD VENIPUNCTURE: CPT

## 2024-04-01 PROCEDURE — 74176 CT ABD & PELVIS W/O CONTRAST: CPT | Performed by: STUDENT IN AN ORGANIZED HEALTH CARE EDUCATION/TRAINING PROGRAM

## 2024-04-02 LAB
LABORATORY COMMENT REPORT: NORMAL
LABORATORY COMMENT REPORT: NORMAL
PATH REPORT.FINAL DX SPEC: NORMAL
PATH REPORT.GROSS SPEC: NORMAL
PATH REPORT.RELEVANT HX SPEC: NORMAL
PATH REPORT.TOTAL CANCER: NORMAL

## 2024-04-04 LAB — BACTERIA UR CULT: ABNORMAL

## 2024-04-29 ENCOUNTER — PREP FOR PROCEDURE (OUTPATIENT)
Dept: PREADMISSION TESTING | Facility: HOSPITAL | Age: 69
End: 2024-04-29

## 2024-04-29 ENCOUNTER — OFFICE VISIT (OUTPATIENT)
Dept: UROLOGY | Facility: CLINIC | Age: 69
End: 2024-04-29
Payer: COMMERCIAL

## 2024-04-29 VITALS
HEART RATE: 80 BPM | RESPIRATION RATE: 16 BRPM | SYSTOLIC BLOOD PRESSURE: 121 MMHG | DIASTOLIC BLOOD PRESSURE: 68 MMHG | WEIGHT: 153.22 LBS | HEIGHT: 68 IN | BODY MASS INDEX: 23.22 KG/M2

## 2024-04-29 DIAGNOSIS — N39.0 URINARY TRACT INFECTION WITH HEMATURIA, SITE UNSPECIFIED: ICD-10-CM

## 2024-04-29 DIAGNOSIS — R31.9 URINARY TRACT INFECTION WITH HEMATURIA, SITE UNSPECIFIED: ICD-10-CM

## 2024-04-29 DIAGNOSIS — N20.1 URETERAL CALCULUS: ICD-10-CM

## 2024-04-29 DIAGNOSIS — N32.89 BLADDER INSTABILITY: ICD-10-CM

## 2024-04-29 DIAGNOSIS — N20.0 NEPHROLITHIASIS: Primary | ICD-10-CM

## 2024-04-29 DIAGNOSIS — N13.30 HYDRONEPHROSIS, UNSPECIFIED HYDRONEPHROSIS TYPE: ICD-10-CM

## 2024-04-29 DIAGNOSIS — R10.9 FLANK PAIN: ICD-10-CM

## 2024-04-29 DIAGNOSIS — R31.0 GROSS HEMATURIA: ICD-10-CM

## 2024-04-29 DIAGNOSIS — Z01.818 PREOPERATIVE TESTING: ICD-10-CM

## 2024-04-29 PROCEDURE — 1036F TOBACCO NON-USER: CPT | Performed by: UROLOGY

## 2024-04-29 PROCEDURE — 3074F SYST BP LT 130 MM HG: CPT | Performed by: UROLOGY

## 2024-04-29 PROCEDURE — 1159F MED LIST DOCD IN RCRD: CPT | Performed by: UROLOGY

## 2024-04-29 PROCEDURE — 3048F LDL-C <100 MG/DL: CPT | Performed by: UROLOGY

## 2024-04-29 PROCEDURE — 3051F HG A1C>EQUAL 7.0%<8.0%: CPT | Performed by: UROLOGY

## 2024-04-29 PROCEDURE — 99214 OFFICE O/P EST MOD 30 MIN: CPT | Performed by: UROLOGY

## 2024-04-29 PROCEDURE — 3008F BODY MASS INDEX DOCD: CPT | Performed by: UROLOGY

## 2024-04-29 PROCEDURE — 3078F DIAST BP <80 MM HG: CPT | Performed by: UROLOGY

## 2024-04-29 PROCEDURE — 1160F RVW MEDS BY RX/DR IN RCRD: CPT | Performed by: UROLOGY

## 2024-04-29 PROCEDURE — 1126F AMNT PAIN NOTED NONE PRSNT: CPT | Performed by: UROLOGY

## 2024-04-29 PROCEDURE — 4010F ACE/ARB THERAPY RXD/TAKEN: CPT | Performed by: UROLOGY

## 2024-04-29 RX ORDER — SULFAMETHOXAZOLE AND TRIMETHOPRIM 800; 160 MG/1; MG/1
1 TABLET ORAL EVERY 12 HOURS
Qty: 6 TABLET | Refills: 0 | Status: SHIPPED | OUTPATIENT
Start: 2024-04-29 | End: 2024-05-02

## 2024-04-29 RX ORDER — SODIUM CHLORIDE, SODIUM LACTATE, POTASSIUM CHLORIDE, CALCIUM CHLORIDE 600; 310; 30; 20 MG/100ML; MG/100ML; MG/100ML; MG/100ML
100 INJECTION, SOLUTION INTRAVENOUS CONTINUOUS
Status: CANCELLED | OUTPATIENT
Start: 2024-05-16

## 2024-04-29 RX ORDER — CIPROFLOXACIN 2 MG/ML
400 INJECTION, SOLUTION INTRAVENOUS ONCE
Status: CANCELLED | OUTPATIENT
Start: 2024-05-16 | End: 2024-05-16

## 2024-04-29 ASSESSMENT — ENCOUNTER SYMPTOMS
FREQUENCY: 1
OCCASIONAL FEELINGS OF UNSTEADINESS: 0
LOSS OF SENSATION IN FEET: 0
FLANK PAIN: 0
DYSURIA: 0
DIFFICULTY URINATING: 0
HEMATURIA: 0

## 2024-04-29 ASSESSMENT — PAIN SCALES - GENERAL: PAINLEVEL: 0-NO PAIN

## 2024-04-29 NOTE — PROGRESS NOTES
Patient denies any pain today. Patient has not had any recent surgeries or hospital admits. Patient denies any concerns about falling or safety. Patient has concerns for frequency. CV     Review of Systems   Genitourinary:  Positive for frequency. Negative for difficulty urinating, dysuria, enuresis, flank pain, hematuria and urgency.   All other systems reviewed and are negative.

## 2024-04-29 NOTE — PROGRESS NOTES
"  Provider Impressions     1/18/2016 established patient returns for follow-up. Patient initially seen in May 2010. Mr. Khanna is a a 64 year-old white male, he has a long history of NEPHROLITHIASIS with a significant history of NONCOMPLIANCE. January 2015 patient was seen at [facility] with severe HYDRONEPHROSIS bilaterally, bilateral OBSTRUCTING URETERAL CALCULI and a STAGHORN RENAL CALCULI. Patient went to the OR for placement of left and right URETERAL STENTS and STONE MANIPULATION. The patient then went main [facility] and per Dr. Taylor: The patient is an  at [employer]. He has no family history of prostate or breast cancer. He has a 40-pack-year cigarette smoking history. He presently smokes one pack of cigarettes per day.     Left distal URETERAL STONE 1.3 cm treated with URETEROSCOPY 2/24/15  Wih CYSTOLITHOLAPAXY 2/24/15  Right . PERCUTANEOUS NEPHROLITHOTOMY with cyberwand and antetgrade ureteroscopy and LL and basketing of impacted distal right ureteral stone 3/10/15  UTI with Patient on IV antibiotics through PIC line finished  Bilateral JJ stents removed today without difficulty     01/18/17, patient states on rare occasions he experiences \"twinges\" in his right kidney. Renal colic CAT scan shows bilateral, 3 mm nonobstructing STONES. Urinalysis, urine culture are both negative. Urine CYTOLOGY is ATYPICAL. PSA is 2.4. He will return in 1 year.     01/17/18, patient states he has occasional discomfort in his right kidney. Renal colic CAT scan is unchanged showing bilateral 3 mm nonobstructing STONES. The majority urine the left lower pole. Urinalysis and urine culture are negative as well as urine cytology. PSA is 3.3. He will return in 1 year.     01/29/19, patient states that he has passed 3 small stones over the last year. His renal colic CAT scan shows bilateral STONES, the largest being 6 mm in the left kidney. We will proceed with an IVP to confirm size and location. If greater than 5 mm, the " patient strongly wishes to pursue ESWL and not have an obstructing stone again. Urinalysis is negative. PSA is stable at 3.3.     03/09/19, patient is complaining of right-sided flank pain. However the KUB reveals a 3 mm stones on the right side and a 6 mm stone on the left. His creatinine was 1.6 and he could not undergo an IVP. We will schedule ESWL of his left renal calculus. Nurse practitioner Juan David will provide preoperative medical risk stratification. His primary care physician, Dr. Doty is no longer with the practice.     03/28/19, ESWL of a 0.7 cm left mid renal calculus.     CALCIUM OXALATE stones     05/02/19, OR, cystoscopy, removal of left double-J ureteral stent, left retropyelogram, left ureteroscopy     12/20/19, MI, 3 cardiac stents     BRILINTA     01/10/20, BIRTHDAY BOY, patient arrives alone. He is recovering from his MI and 3 recent stent placements. No complaints of flank pain. Urinalysis and urine culture are normal. Renal colic CAT scan shows a 2 mm stone in the left kidney. PSA 2.10     October 2020, hospitalized for GI bleed     01/08/21, patient arrives alone. He states that he intermittently passes a small 2 or 3 mm stone every 3 months. He has no complaints of pain. Urinalysis, urine culture and urine cytology are all negative. PSA is normal at 1.80. He has bilateral 3 mm stones in the kidneys as seen on his renal colic CAT scan. Also a 1.8 cm left adrenal nodule. He will return in 1 year.     December 2 to December 21, 2021, admitted to [facility] with Covid pneumonia.     March 8, 2022, telephone call, renal ultrasound shows a 7 mm, 4 mm and 6 mm right renal calculus. 6 mm left renal calculus. 8 mm bladder stone. He will obtain a renal colic CAT scan and KUB     March 16, 2022, patient arrives alone. He has intermittent right flank pain. None renal colic CAT scan and KUB reveals a 4 mm stone in the bladder which the patient states he is passed. Also stones in the left kidney measuring 2  to 3 mm and in the right kidney 3 to 4 mm. He wishes to continue to observe and I agree with that plan. Urinalysis and urine culture were negative. He will return in 1 year.     February 1, 2023, patient arrives alone. He recently passed a stone following his renal colic CAT scan. Urinalysis no blood. Urine culture no growth. Renal colic CAT scan shows a 3 mm stone in the right kidney and a 3 mm stone in the left kidney, both nonobstructing. There was a 5 mm bladder calculus which evidently he has passed. He will return in 1 year.     January 23, 2024, telephone call, renal colic CAT scan identifies a 7 mm right distal ureteral calculus.  We will obtain a creatinine and CT urogram prior to his follow-up visit.     February 9, 2024, CT urogram denied by insurance.  Last years renal colic CAT scan showed a 5 mm distal ureteral calculus.     March 15, 2024, patient arrives alone.  He states that he recently passed a stone.  He also did not obtain his urine studies or PSA.  I am reluctant to believe that he passed this 8 mm stone without any discomfort.  However I am unwilling to schedule surgery unless I have confirmation.  The CT urogram would have provided that confirmation however it was denied by his insurance.  Therefore we will repeat the renal colic CAT scan now and if the stone still remains in the distal right ureter with hydronephrosis, then we will proceed with ureteroscopy and laser lithotripsy.  He will also obtain his urine studies and PSA.    April 29, 2024, patient arrives alone.  He continues to complain of right lower quadrant pain.  Renal colic CAT scan states that the 8 mm stone has moved just medial to the right UVJ.  I personally reviewed this CAT scan images with the patient on my computer monitor in the examination room.  I believe the stone is not in the bladder but at the UVJ and he wishes to proceed with laser lithotripsy and ureteroscopy.  PSA is normal at 2.00.  He does have a UTI of  Staphylococcus which we will treat with Bactrim.  Also, multiple nonobstructing bilateral renal calculi measuring up to 3 mm.  He will receive preoperative cardiac risk stratification from Dr. Riri Aranda surgery will be scheduled for May 16, 2024     PLAN:     #1 patient will scheduled for cystoscopy, right retropyelogram, right ureteroscopy with holmium laser lithotripsy, possible placement of ureteral stent    Dr. Riri Aranda will provide preoperative cardiac risk stratification    January 2025 with renal colic CT, urine studies, and PSA.     Physical Exam  Vitals and nursing note reviewed.   Constitutional:       Appearance: Normal appearance.   HENT:      Head: Normocephalic and atraumatic.   Pulmonary:      Effort: Pulmonary effort is normal.   Abdominal:      Palpations: Abdomen is soft.      Tenderness: There is no abdominal tenderness.   Musculoskeletal:         General: Normal range of motion.      Cervical back: Normal range of motion and neck supple.   Neurological:      General: No focal deficit present.      Mental Status: He is alert and oriented to person, place, and time.   Psychiatric:         Mood and Affect: Mood normal.         Behavior: Behavior normal.       This note was created with voice recognition software and was not corrected for typographical or grammatical errors

## 2024-04-29 NOTE — H&P (VIEW-ONLY)
"  Provider Impressions     1/18/2016 established patient returns for follow-up. Patient initially seen in May 2010. Mr. Khanna is a a 64 year-old white male, he has a long history of NEPHROLITHIASIS with a significant history of NONCOMPLIANCE. January 2015 patient was seen at [facility] with severe HYDRONEPHROSIS bilaterally, bilateral OBSTRUCTING URETERAL CALCULI and a STAGHORN RENAL CALCULI. Patient went to the OR for placement of left and right URETERAL STENTS and STONE MANIPULATION. The patient then went main [facility] and per Dr. Taylor: The patient is an  at [employer]. He has no family history of prostate or breast cancer. He has a 40-pack-year cigarette smoking history. He presently smokes one pack of cigarettes per day.     Left distal URETERAL STONE 1.3 cm treated with URETEROSCOPY 2/24/15  Wih CYSTOLITHOLAPAXY 2/24/15  Right . PERCUTANEOUS NEPHROLITHOTOMY with cyberwand and antetgrade ureteroscopy and LL and basketing of impacted distal right ureteral stone 3/10/15  UTI with Patient on IV antibiotics through PIC line finished  Bilateral JJ stents removed today without difficulty     01/18/17, patient states on rare occasions he experiences \"twinges\" in his right kidney. Renal colic CAT scan shows bilateral, 3 mm nonobstructing STONES. Urinalysis, urine culture are both negative. Urine CYTOLOGY is ATYPICAL. PSA is 2.4. He will return in 1 year.     01/17/18, patient states he has occasional discomfort in his right kidney. Renal colic CAT scan is unchanged showing bilateral 3 mm nonobstructing STONES. The majority urine the left lower pole. Urinalysis and urine culture are negative as well as urine cytology. PSA is 3.3. He will return in 1 year.     01/29/19, patient states that he has passed 3 small stones over the last year. His renal colic CAT scan shows bilateral STONES, the largest being 6 mm in the left kidney. We will proceed with an IVP to confirm size and location. If greater than 5 mm, the " patient strongly wishes to pursue ESWL and not have an obstructing stone again. Urinalysis is negative. PSA is stable at 3.3.     03/09/19, patient is complaining of right-sided flank pain. However the KUB reveals a 3 mm stones on the right side and a 6 mm stone on the left. His creatinine was 1.6 and he could not undergo an IVP. We will schedule ESWL of his left renal calculus. Nurse practitioner Juan David will provide preoperative medical risk stratification. His primary care physician, Dr. Doty is no longer with the practice.     03/28/19, ESWL of a 0.7 cm left mid renal calculus.     CALCIUM OXALATE stones     05/02/19, OR, cystoscopy, removal of left double-J ureteral stent, left retropyelogram, left ureteroscopy     12/20/19, MI, 3 cardiac stents     BRILINTA     01/10/20, BIRTHDAY BOY, patient arrives alone. He is recovering from his MI and 3 recent stent placements. No complaints of flank pain. Urinalysis and urine culture are normal. Renal colic CAT scan shows a 2 mm stone in the left kidney. PSA 2.10     October 2020, hospitalized for GI bleed     01/08/21, patient arrives alone. He states that he intermittently passes a small 2 or 3 mm stone every 3 months. He has no complaints of pain. Urinalysis, urine culture and urine cytology are all negative. PSA is normal at 1.80. He has bilateral 3 mm stones in the kidneys as seen on his renal colic CAT scan. Also a 1.8 cm left adrenal nodule. He will return in 1 year.     December 2 to December 21, 2021, admitted to [facility] with Covid pneumonia.     March 8, 2022, telephone call, renal ultrasound shows a 7 mm, 4 mm and 6 mm right renal calculus. 6 mm left renal calculus. 8 mm bladder stone. He will obtain a renal colic CAT scan and KUB     March 16, 2022, patient arrives alone. He has intermittent right flank pain. None renal colic CAT scan and KUB reveals a 4 mm stone in the bladder which the patient states he is passed. Also stones in the left kidney measuring 2  to 3 mm and in the right kidney 3 to 4 mm. He wishes to continue to observe and I agree with that plan. Urinalysis and urine culture were negative. He will return in 1 year.     February 1, 2023, patient arrives alone. He recently passed a stone following his renal colic CAT scan. Urinalysis no blood. Urine culture no growth. Renal colic CAT scan shows a 3 mm stone in the right kidney and a 3 mm stone in the left kidney, both nonobstructing. There was a 5 mm bladder calculus which evidently he has passed. He will return in 1 year.     January 23, 2024, telephone call, renal colic CAT scan identifies a 7 mm right distal ureteral calculus.  We will obtain a creatinine and CT urogram prior to his follow-up visit.     February 9, 2024, CT urogram denied by insurance.  Last years renal colic CAT scan showed a 5 mm distal ureteral calculus.     March 15, 2024, patient arrives alone.  He states that he recently passed a stone.  He also did not obtain his urine studies or PSA.  I am reluctant to believe that he passed this 8 mm stone without any discomfort.  However I am unwilling to schedule surgery unless I have confirmation.  The CT urogram would have provided that confirmation however it was denied by his insurance.  Therefore we will repeat the renal colic CAT scan now and if the stone still remains in the distal right ureter with hydronephrosis, then we will proceed with ureteroscopy and laser lithotripsy.  He will also obtain his urine studies and PSA.    April 29, 2024, patient arrives alone.  He continues to complain of right lower quadrant pain.  Renal colic CAT scan states that the 8 mm stone has moved just medial to the right UVJ.  I personally reviewed this CAT scan images with the patient on my computer monitor in the examination room.  I believe the stone is not in the bladder but at the UVJ and he wishes to proceed with laser lithotripsy and ureteroscopy.  PSA is normal at 2.00.  He does have a UTI of  Staphylococcus which we will treat with Bactrim.  Also, multiple nonobstructing bilateral renal calculi measuring up to 3 mm.  He will receive preoperative cardiac risk stratification from Dr. Riri Aranda surgery will be scheduled for May 16, 2024     PLAN:     #1 patient will scheduled for cystoscopy, right retropyelogram, right ureteroscopy with holmium laser lithotripsy, possible placement of ureteral stent    Dr. Riri Aranda will provide preoperative cardiac risk stratification    January 2025 with renal colic CT, urine studies, and PSA.     Physical Exam  Vitals and nursing note reviewed.   Constitutional:       Appearance: Normal appearance.   HENT:      Head: Normocephalic and atraumatic.   Pulmonary:      Effort: Pulmonary effort is normal.   Abdominal:      Palpations: Abdomen is soft.      Tenderness: There is no abdominal tenderness.   Musculoskeletal:         General: Normal range of motion.      Cervical back: Normal range of motion and neck supple.   Neurological:      General: No focal deficit present.      Mental Status: He is alert and oriented to person, place, and time.   Psychiatric:         Mood and Affect: Mood normal.         Behavior: Behavior normal.       This note was created with voice recognition software and was not corrected for typographical or grammatical errors

## 2024-04-29 NOTE — LETTER
"April 29, 2024     Moisés Owusu DO  [address]  [facility] [address]    Patient: Pemyan Khanna   YOB: 1955   Date of Visit: 4/29/2024       Dear Dr. Moisés Owusu DO:    Thank you for referring Peyman Khanna to me for evaluation. Below are my notes for this consultation.  If you have questions, please do not hesitate to call me. I look forward to following your patient along with you.       Sincerely,     Jose Red MD      CC: Riri Aranda MD  ______________________________________________________________________________________      Provider Impressions     1/18/2016 established patient returns for follow-up. Patient initially seen in May 2010. Mr. Khanna is a a 64 year-old white male, he has a long history of NEPHROLITHIASIS with a significant history of NONCOMPLIANCE. January 2015 patient was seen at [facility] with severe HYDRONEPHROSIS bilaterally, bilateral OBSTRUCTING URETERAL CALCULI and a STAGHORN RENAL CALCULI. Patient went to the OR for placement of left and right URETERAL STENTS and STONE MANIPULATION. The patient then went main [facility] and per Dr. Taylor: The patient is an  at [employer]. He has no family history of prostate or breast cancer. He has a 40-pack-year cigarette smoking history. He presently smokes one pack of cigarettes per day.     Left distal URETERAL STONE 1.3 cm treated with URETEROSCOPY 2/24/15  Wih CYSTOLITHOLAPAXY 2/24/15  Right . PERCUTANEOUS NEPHROLITHOTOMY with cyberwand and antetgrade ureteroscopy and LL and basketing of impacted distal right ureteral stone 3/10/15  UTI with Patient on IV antibiotics through PIC line finished  Bilateral JJ stents removed today without difficulty     01/18/17, patient states on rare occasions he experiences \"twinges\" in his right kidney. Renal colic CAT scan shows bilateral, 3 mm nonobstructing STONES. Urinalysis, urine culture are both negative. Urine CYTOLOGY is ATYPICAL. PSA is 2.4. He will return in 1 " year.     01/17/18, patient states he has occasional discomfort in his right kidney. Renal colic CAT scan is unchanged showing bilateral 3 mm nonobstructing STONES. The majority urine the left lower pole. Urinalysis and urine culture are negative as well as urine cytology. PSA is 3.3. He will return in 1 year.     01/29/19, patient states that he has passed 3 small stones over the last year. His renal colic CAT scan shows bilateral STONES, the largest being 6 mm in the left kidney. We will proceed with an IVP to confirm size and location. If greater than 5 mm, the patient strongly wishes to pursue ESWL and not have an obstructing stone again. Urinalysis is negative. PSA is stable at 3.3.     03/09/19, patient is complaining of right-sided flank pain. However the KUB reveals a 3 mm stones on the right side and a 6 mm stone on the left. His creatinine was 1.6 and he could not undergo an IVP. We will schedule ESWL of his left renal calculus. Nurse practitioner Juan David will provide preoperative medical risk stratification. His primary care physician, Dr. Doty is no longer with the practice.     03/28/19, ESWL of a 0.7 cm left mid renal calculus.     CALCIUM OXALATE stones     05/02/19, OR, cystoscopy, removal of left double-J ureteral stent, left retropyelogram, left ureteroscopy     12/20/19, MI, 3 cardiac stents     BRILINTA     01/10/20, BIRTHDAY BOY, patient arrives alone. He is recovering from his MI and 3 recent stent placements. No complaints of flank pain. Urinalysis and urine culture are normal. Renal colic CAT scan shows a 2 mm stone in the left kidney. PSA 2.10     October 2020, hospitalized for GI bleed     01/08/21, patient arrives alone. He states that he intermittently passes a small 2 or 3 mm stone every 3 months. He has no complaints of pain. Urinalysis, urine culture and urine cytology are all negative. PSA is normal at 1.80. He has bilateral 3 mm stones in the kidneys as seen on his renal colic CAT  scan. Also a 1.8 cm left adrenal nodule. He will return in 1 year.     December 2 to December 21, 2021, admitted to [facility] with Covid pneumonia.     March 8, 2022, telephone call, renal ultrasound shows a 7 mm, 4 mm and 6 mm right renal calculus. 6 mm left renal calculus. 8 mm bladder stone. He will obtain a renal colic CAT scan and KUB     March 16, 2022, patient arrives alone. He has intermittent right flank pain. None renal colic CAT scan and KUB reveals a 4 mm stone in the bladder which the patient states he is passed. Also stones in the left kidney measuring 2 to 3 mm and in the right kidney 3 to 4 mm. He wishes to continue to observe and I agree with that plan. Urinalysis and urine culture were negative. He will return in 1 year.     February 1, 2023, patient arrives alone. He recently passed a stone following his renal colic CAT scan. Urinalysis no blood. Urine culture no growth. Renal colic CAT scan shows a 3 mm stone in the right kidney and a 3 mm stone in the left kidney, both nonobstructing. There was a 5 mm bladder calculus which evidently he has passed. He will return in 1 year.     January 23, 2024, telephone call, renal colic CAT scan identifies a 7 mm right distal ureteral calculus.  We will obtain a creatinine and CT urogram prior to his follow-up visit.     February 9, 2024, CT urogram denied by insurance.  Last years renal colic CAT scan showed a 5 mm distal ureteral calculus.     March 15, 2024, patient arrives alone.  He states that he recently passed a stone.  He also did not obtain his urine studies or PSA.  I am reluctant to believe that he passed this 8 mm stone without any discomfort.  However I am unwilling to schedule surgery unless I have confirmation.  The CT urogram would have provided that confirmation however it was denied by his insurance.  Therefore we will repeat the renal colic CAT scan now and if the stone still remains in the distal right ureter with hydronephrosis, then we  will proceed with ureteroscopy and laser lithotripsy.  He will also obtain his urine studies and PSA.    April 29, 2024, patient arrives alone.  He continues to complain of right lower quadrant pain.  Renal colic CAT scan states that the 8 mm stone has moved just medial to the right UVJ.  I personally reviewed this CAT scan images with the patient on my computer monitor in the examination room.  I believe the stone is not in the bladder but at the UVJ and he wishes to proceed with laser lithotripsy and ureteroscopy.  PSA is normal at 2.00.  He does have a UTI of Staphylococcus which we will treat with Bactrim.  Also, multiple nonobstructing bilateral renal calculi measuring up to 3 mm.  He will receive preoperative cardiac risk stratification from Dr. Riri Aranda surgery will be scheduled for May 16, 2024     PLAN:     #1 patient will scheduled for cystoscopy, right retropyelogram, right ureteroscopy with holmium laser lithotripsy, possible placement of ureteral stent    Dr. Riri Aranda will provide preoperative cardiac risk stratification    January 2025 with renal colic CT, urine studies, and PSA.     Physical Exam  Vitals and nursing note reviewed.   Constitutional:       Appearance: Normal appearance.   HENT:      Head: Normocephalic and atraumatic.   Pulmonary:      Effort: Pulmonary effort is normal.   Abdominal:      Palpations: Abdomen is soft.      Tenderness: There is no abdominal tenderness.   Musculoskeletal:         General: Normal range of motion.      Cervical back: Normal range of motion and neck supple.   Neurological:      General: No focal deficit present.      Mental Status: He is alert and oriented to person, place, and time.   Psychiatric:         Mood and Affect: Mood normal.         Behavior: Behavior normal.       This note was created with voice recognition software and was not corrected for typographical or grammatical errors

## 2024-05-06 ENCOUNTER — LAB (OUTPATIENT)
Dept: LAB | Facility: LAB | Age: 69
End: 2024-05-06
Payer: MEDICARE

## 2024-05-06 DIAGNOSIS — N20.1 URETERAL CALCULI: ICD-10-CM

## 2024-05-06 DIAGNOSIS — Z01.818 PREOPERATIVE TESTING: ICD-10-CM

## 2024-05-06 LAB
ABO GROUP (TYPE) IN BLOOD: NORMAL
ANION GAP SERPL CALC-SCNC: 12 MMOL/L (ref 10–20)
ANTIBODY SCREEN: NORMAL
APPEARANCE UR: CLEAR
BILIRUB UR STRIP.AUTO-MCNC: NEGATIVE MG/DL
BUN SERPL-MCNC: 32 MG/DL (ref 6–23)
CALCIUM SERPL-MCNC: 9.1 MG/DL (ref 8.6–10.3)
CHLORIDE SERPL-SCNC: 107 MMOL/L (ref 98–107)
CO2 SERPL-SCNC: 25 MMOL/L (ref 21–32)
COLOR UR: YELLOW
CREAT SERPL-MCNC: 2.52 MG/DL (ref 0.5–1.3)
EGFRCR SERPLBLD CKD-EPI 2021: 27 ML/MIN/1.73M*2
ERYTHROCYTE [DISTWIDTH] IN BLOOD BY AUTOMATED COUNT: 13.4 % (ref 11.5–14.5)
GLUCOSE SERPL-MCNC: 159 MG/DL (ref 74–99)
GLUCOSE UR STRIP.AUTO-MCNC: NEGATIVE MG/DL
HCT VFR BLD AUTO: 43.6 % (ref 41–52)
HGB BLD-MCNC: 13.8 G/DL (ref 13.5–17.5)
INR PPP: 1.1 (ref 0.9–1.1)
KETONES UR STRIP.AUTO-MCNC: NEGATIVE MG/DL
LEUKOCYTE ESTERASE UR QL STRIP.AUTO: ABNORMAL
MCH RBC QN AUTO: 30.3 PG (ref 26–34)
MCHC RBC AUTO-ENTMCNC: 31.7 G/DL (ref 32–36)
MCV RBC AUTO: 96 FL (ref 80–100)
MUCOUS THREADS #/AREA URNS AUTO: ABNORMAL /LPF
NITRITE UR QL STRIP.AUTO: NEGATIVE
NRBC BLD-RTO: 0 /100 WBCS (ref 0–0)
PH UR STRIP.AUTO: 6 [PH]
PLATELET # BLD AUTO: 144 X10*3/UL (ref 150–450)
POTASSIUM SERPL-SCNC: 5 MMOL/L (ref 3.5–5.3)
PROT UR STRIP.AUTO-MCNC: ABNORMAL MG/DL
PROTHROMBIN TIME: 12.1 SECONDS (ref 9.8–12.8)
RBC # BLD AUTO: 4.55 X10*6/UL (ref 4.5–5.9)
RBC # UR STRIP.AUTO: NEGATIVE /UL
RBC #/AREA URNS AUTO: ABNORMAL /HPF
RH FACTOR (ANTIGEN D): NORMAL
SODIUM SERPL-SCNC: 139 MMOL/L (ref 136–145)
SP GR UR STRIP.AUTO: 1.02
UROBILINOGEN UR STRIP.AUTO-MCNC: <2 MG/DL
WBC # BLD AUTO: 5 X10*3/UL (ref 4.4–11.3)
WBC #/AREA URNS AUTO: ABNORMAL /HPF

## 2024-05-06 PROCEDURE — 87086 URINE CULTURE/COLONY COUNT: CPT

## 2024-05-06 PROCEDURE — 85610 PROTHROMBIN TIME: CPT

## 2024-05-06 PROCEDURE — 86850 RBC ANTIBODY SCREEN: CPT

## 2024-05-06 PROCEDURE — 80048 BASIC METABOLIC PNL TOTAL CA: CPT

## 2024-05-06 PROCEDURE — 86901 BLOOD TYPING SEROLOGIC RH(D): CPT

## 2024-05-06 PROCEDURE — 85027 COMPLETE CBC AUTOMATED: CPT

## 2024-05-06 PROCEDURE — 81001 URINALYSIS AUTO W/SCOPE: CPT

## 2024-05-06 PROCEDURE — 86900 BLOOD TYPING SEROLOGIC ABO: CPT

## 2024-05-06 PROCEDURE — 36415 COLL VENOUS BLD VENIPUNCTURE: CPT

## 2024-05-07 LAB — BACTERIA UR CULT: NORMAL

## 2024-05-10 ENCOUNTER — TELEPHONE (OUTPATIENT)
Dept: CARDIOLOGY | Facility: CLINIC | Age: 69
End: 2024-05-10
Payer: COMMERCIAL

## 2024-05-10 NOTE — TELEPHONE ENCOUNTER
Received preop clearance form Dr. Red office for patient pending Right Ureteral Calculus Laser Lithotripsy on 5/16/24 with Dr. Red.     Patient seen 11/14/23 with Dr. Manoj MD   Peyman Khanna is a 68 y.o. year old male patient with history of for previous coronary artery disease.  Status post remote coronary stenting.  Been doing well from a cardiac standpoint no complaint no symptoms of chest pain or shortness of breath.  He does have have what appears to be a right carotid bruit.  I discussed with the patient in length we will continue medication we will obtain carotid duplex scan will call results.  We will follow-up as.     Last Echo and Nuclear completed in 2020 after Angiography in 2019.   Form placed for Dr. Riri Aranda MD [facility] review today.     Patient on ASA therapy

## 2024-05-10 NOTE — TELEPHONE ENCOUNTER
Per Dr. Riri Aranda MD [facility] with [facility], patient is cleared for procedure. Patient can hold ASA x7 days if necessary.   Form completed and faxed with confirmation received. Placed to scanning.

## 2024-05-15 ENCOUNTER — HOSPITAL ENCOUNTER (OUTPATIENT)
Dept: CARDIOLOGY | Facility: HOSPITAL | Age: 69
Discharge: HOME | End: 2024-05-15
Payer: COMMERCIAL

## 2024-05-15 DIAGNOSIS — Z01.818 PREOPERATIVE TESTING: ICD-10-CM

## 2024-05-15 PROCEDURE — 93005 ELECTROCARDIOGRAM TRACING: CPT

## 2024-05-20 LAB
ATRIAL RATE: 84 BPM
P AXIS: 69 DEGREES
P OFFSET: 203 MS
P ONSET: 156 MS
PR INTERVAL: 148 MS
Q ONSET: 230 MS
QRS COUNT: 13 BEATS
QRS DURATION: 66 MS
QT INTERVAL: 334 MS
QTC CALCULATION(BAZETT): 394 MS
QTC FREDERICIA: 373 MS
R AXIS: -11 DEGREES
T AXIS: 46 DEGREES
T OFFSET: 397 MS
VENTRICULAR RATE: 84 BPM

## 2024-05-21 NOTE — PREPROCEDURE INSTRUCTIONS
NPO Instructions: Do not eat any food after midnight the night before your surgery/procedure.    Additional Instructions:

## 2024-05-22 ENCOUNTER — ANESTHESIA EVENT (OUTPATIENT)
Dept: OPERATING ROOM | Facility: HOSPITAL | Age: 69
End: 2024-05-22
Payer: COMMERCIAL

## 2024-05-22 SDOH — HEALTH STABILITY: MENTAL HEALTH: CURRENT SMOKER: 0

## 2024-05-22 NOTE — ANESTHESIA PREPROCEDURE EVALUATION
Peyman Khanna is a 69 y.o. male here for:    Ureteral Lithotripsy Laser, Cystoscopy with Insertion Stent Ureter  With Jose Red MD  Pre-Op Diagnosis Codes:     * Kidney stone [N20.0]     * Urinary tract stone [N20.1]     * Hydronephrosis, unspecified hydronephrosis type [N13.30]     * Flank pain [R10.9]     * Urinary tract infection with hematuria, site unspecified [N39.0, R31.9]     * Irritable bladder [N32.89]    Relevant Problems   Anesthesia (within normal limits)      Cardiac  Echo 3/2020:  CONCLUSIONS:     1. The left ventricle shows possible mild borderline LVH without dilatation with preserved systolic function with an estimated ejection fraction range of 60-65% without apparent regional wall motion abnormalities  2. LV diastolic dysfunction  3. Normal right ventricular size and function  4. Structurally and functionally normal aortic, mitral, tricuspid and pulmonic valves with trace mitral and tricuspid insufficiency with no elevation of RVSP  5. Normal left and right atrium  6. Normal aortic root  7. Normal inferior vena cava  8. No apparent pericardial effusion, echo producing mass or intracardiac shunt  9. No comparison tracing available for comment    Carotid US 11/2023:  CONCLUSIONS:  Right Carotid: Findings are consistent with less than 50% stenosis of the right proximal internal carotid artery. Laminar flow seen by color Doppler. Right external carotid artery appears patent with no evidence of stenosis. No evidence of hemodynamically significant stenosis of the right common carotid artery. The right vertebral artery is patent with antegrade flow. No significant changes from 11/2020.  Left Carotid: Findings are consistent with less than 50% stenosis of the left proximal internal carotid artery. Laminar flow seen by color Doppler. Left external carotid artery appears patent with no evidence of stenosis. No evidence of hemodynamically significant stenosis of the left common carotid artery. The  "left vertebral artery is patent with antegrade flow. No significant changes from 2020.     (+) Abnormal EKG   (+) CAD S/P percutaneous coronary angioplasty   (+) Primary hypertension   (+) STEMI (ST elevation myocardial infarction) (Multi)      Pulmonary (within normal limits)      Neuro (within normal limits)      GI   (+) GERD (gastroesophageal reflux disease)   (+) GI bleed      /Renal   (+) Acute UTI   (+) Hydronephrosis   (+) Nephrolithiasis   (+) Urinary tract infection with hematuria      Liver (within normal limits)      Endocrine   (+) Type 2 diabetes mellitus with diabetic chronic kidney disease (Multi)      Hematology (within normal limits)      Musculoskeletal (within normal limits)      HEENT (within normal limits)      ID   (+) Acute UTI   (+) Urinary tract infection with hematuria      Skin (within normal limits)      GYN (within normal limits)       Lab Results   Component Value Date    HGB 13.8 2024    HCT 43.6 2024    WBC 5.0 2024     (L) 2024     2024    K 5.0 2024     2024    CREATININE 2.52 (H) 2024    BUN 32 (H) 2024       Social History     Tobacco Use   Smoking Status Former    Current packs/day: 0.00    Average packs/day: 1 pack/day for 38.0 years (38.0 ttl pk-yrs)    Types: Cigarettes    Start date:     Quit date:     Years since quittin.3   Smokeless Tobacco Never       No Known Allergies    Current Outpatient Medications   Medication Instructions    aspirin 81 mg chewable tablet 1 tablet, oral, Daily    atorvastatin (LIPITOR) 40 mg, oral, Nightly    BD Brandi 2nd Gen Pen Needle 32 gauge x \" needle     calcitriol (ROCALTROL) 0.25 mcg, oral, Mon-Wed-Fri     insulin lispro protamin-lispro (HumaLOG Mix 75-25) 100 unit/mL (75-25) injection 10 Units, subcutaneous, 2 times daily, For glucose more than 100<BR>    lisinopril 5 mg tablet     metoprolol tartrate (LOPRESSOR) 25 mg, oral, Daily    nitroglycerin " (NITROSTAT) 0.4 mg, sublingual, Every 5 min PRN, UP TO 3 DOSES    pantoprazole (PROTONIX) 40 mg, oral, Daily       Past Surgical History:   Procedure Laterality Date    CT ABDOMEN PELVIS ANGIOGRAM W AND/OR WO IV CONTRAST  05/29/2020    CT ABDOMEN PELVIS ANGIOGRAM W AND/OR WO IV CONTRAST 5/29/2020 [facility] CLINICAL LEGACY    IR ANGIOGRAM INFERIOR EPIGASTRIC  05/29/2020    IR ANGIOGRAM INFERIOR EPIGASTRIC 5/29/2020 [facility] INPATIENT LEGACY    IR ANGIOGRAM INFERIOR EPIGASTRIC  05/29/2020    IR ANGIOGRAM INFERIOR EPIGASTRIC 5/29/2020 [facility] INPATIENT LEGACY    IR EMBOLIZATION LYMPH NODE Bilateral 05/29/2020    IR EMBOLIZATION LYMPH NODE 5/29/2020 [facility] INPATIENT LEGACY    IR EMBOLIZATION LYMPH NODE Bilateral 05/29/2020    [facility]    LITHOTRIPSY  01/18/2016    Renal Lithotripsy    OTHER SURGICAL HISTORY  01/18/2016    Cystoscopy With Ureteroscopy With Removal Of Calculus    OTHER SURGICAL HISTORY  01/18/2016    Cystoscopy With Insertion Of Ureteral Stent    OTHER SURGICAL HISTORY  12/01/2021    Endoscopic cauterization    OTHER SURGICAL HISTORY  12/01/2021    Spinal surgery    OTHER SURGICAL HISTORY  12/01/2021    Lithotomy    OTHER SURGICAL HISTORY  01/08/2021    Cardiac catheterization with stent placement    OTHER SURGICAL HISTORY  01/08/2021    Cataract surgery       Family History   Problem Relation Name Age of Onset    Diabetes Father      Emphysema Father      Cancer Father      Stroke Brother      Cancer Other          Multiple Family Members    Diabetes Other          Multiple Family Members       NPO Details:  No data recorded    Physical Exam    Airway  Mallampati: II  TM distance: >3 FB  Neck ROM: full     Cardiovascular - normal exam     Dental   (+) upper dentures, lower dentures     Pulmonary - normal exam     Abdominal            Anesthesia Plan    History of general anesthesia?: yes  History of complications of general anesthesia?: no    ASA 3     general     The  patient is not a current smoker.    intravenous induction   Postoperative administration of opioids is intended.  Trial extubation is planned.  Anesthetic plan and risks discussed with patient.    Plan discussed with CRNA.

## 2024-05-23 ENCOUNTER — HOSPITAL ENCOUNTER (OUTPATIENT)
Facility: HOSPITAL | Age: 69
Setting detail: OUTPATIENT SURGERY
Discharge: HOME | End: 2024-05-23
Attending: UROLOGY | Admitting: UROLOGY
Payer: COMMERCIAL

## 2024-05-23 ENCOUNTER — ANESTHESIA (OUTPATIENT)
Dept: OPERATING ROOM | Facility: HOSPITAL | Age: 69
End: 2024-05-23
Payer: COMMERCIAL

## 2024-05-23 ENCOUNTER — APPOINTMENT (OUTPATIENT)
Dept: RADIOLOGY | Facility: HOSPITAL | Age: 69
End: 2024-05-23
Payer: COMMERCIAL

## 2024-05-23 VITALS
BODY MASS INDEX: 22.49 KG/M2 | HEART RATE: 77 BPM | TEMPERATURE: 96.1 F | OXYGEN SATURATION: 100 % | DIASTOLIC BLOOD PRESSURE: 74 MMHG | RESPIRATION RATE: 16 BRPM | WEIGHT: 148.37 LBS | SYSTOLIC BLOOD PRESSURE: 120 MMHG | HEIGHT: 68 IN

## 2024-05-23 DIAGNOSIS — R31.9 URINARY TRACT INFECTION WITH HEMATURIA, SITE UNSPECIFIED: ICD-10-CM

## 2024-05-23 DIAGNOSIS — N20.0 NEPHROLITHIASIS: ICD-10-CM

## 2024-05-23 DIAGNOSIS — N20.1 URETERAL CALCULUS: Primary | ICD-10-CM

## 2024-05-23 DIAGNOSIS — N32.89 BLADDER INSTABILITY: ICD-10-CM

## 2024-05-23 DIAGNOSIS — N39.0 URINARY TRACT INFECTION WITH HEMATURIA, SITE UNSPECIFIED: ICD-10-CM

## 2024-05-23 DIAGNOSIS — R10.9 FLANK PAIN: ICD-10-CM

## 2024-05-23 DIAGNOSIS — N13.30 HYDRONEPHROSIS, UNSPECIFIED HYDRONEPHROSIS TYPE: ICD-10-CM

## 2024-05-23 LAB — GLUCOSE BLD MANUAL STRIP-MCNC: 124 MG/DL (ref 74–99)

## 2024-05-23 PROCEDURE — 82365 CALCULUS SPECTROSCOPY: CPT | Performed by: UROLOGY

## 2024-05-23 PROCEDURE — 74420 UROGRAPHY RTRGR +-KUB: CPT

## 2024-05-23 PROCEDURE — 2500000004 HC RX 250 GENERAL PHARMACY W/ HCPCS (ALT 636 FOR OP/ED): Performed by: UROLOGY

## 2024-05-23 PROCEDURE — 3600000009 HC OR TIME - EACH INCREMENTAL 1 MINUTE - PROCEDURE LEVEL FOUR: Performed by: UROLOGY

## 2024-05-23 PROCEDURE — A4217 STERILE WATER/SALINE, 500 ML: HCPCS | Performed by: UROLOGY

## 2024-05-23 PROCEDURE — 3700000001 HC GENERAL ANESTHESIA TIME - INITIAL BASE CHARGE: Performed by: UROLOGY

## 2024-05-23 PROCEDURE — 52353 CYSTOURETERO W/LITHOTRIPSY: CPT | Performed by: UROLOGY

## 2024-05-23 PROCEDURE — 7100000009 HC PHASE TWO TIME - INITIAL BASE CHARGE: Performed by: UROLOGY

## 2024-05-23 PROCEDURE — 2500000005 HC RX 250 GENERAL PHARMACY W/O HCPCS: Performed by: ANESTHESIOLOGY

## 2024-05-23 PROCEDURE — C1769 GUIDE WIRE: HCPCS | Performed by: UROLOGY

## 2024-05-23 PROCEDURE — 2720000007 HC OR 272 NO HCPCS: Performed by: UROLOGY

## 2024-05-23 PROCEDURE — 82947 ASSAY GLUCOSE BLOOD QUANT: CPT

## 2024-05-23 PROCEDURE — 74420 UROGRAPHY RTRGR +-KUB: CPT | Performed by: UROLOGY

## 2024-05-23 PROCEDURE — 3700000002 HC GENERAL ANESTHESIA TIME - EACH INCREMENTAL 1 MINUTE: Performed by: UROLOGY

## 2024-05-23 PROCEDURE — 7100000010 HC PHASE TWO TIME - EACH INCREMENTAL 1 MINUTE: Performed by: UROLOGY

## 2024-05-23 PROCEDURE — 7100000001 HC RECOVERY ROOM TIME - INITIAL BASE CHARGE: Performed by: UROLOGY

## 2024-05-23 PROCEDURE — 2500000004 HC RX 250 GENERAL PHARMACY W/ HCPCS (ALT 636 FOR OP/ED): Performed by: ANESTHESIOLOGY

## 2024-05-23 PROCEDURE — 2500000001 HC RX 250 WO HCPCS SELF ADMINISTERED DRUGS (ALT 637 FOR MEDICARE OP): Performed by: UROLOGY

## 2024-05-23 PROCEDURE — 7100000002 HC RECOVERY ROOM TIME - EACH INCREMENTAL 1 MINUTE: Performed by: UROLOGY

## 2024-05-23 PROCEDURE — 3600000004 HC OR TIME - INITIAL BASE CHARGE - PROCEDURE LEVEL FOUR: Performed by: UROLOGY

## 2024-05-23 RX ORDER — LIDOCAINE HYDROCHLORIDE 20 MG/ML
INJECTION, SOLUTION INFILTRATION; PERINEURAL AS NEEDED
Status: DISCONTINUED | OUTPATIENT
Start: 2024-05-23 | End: 2024-05-23

## 2024-05-23 RX ORDER — OXYCODONE AND ACETAMINOPHEN 5; 325 MG/1; MG/1
1 TABLET ORAL EVERY 6 HOURS PRN
Qty: 15 TABLET | Refills: 0 | Status: SHIPPED | OUTPATIENT
Start: 2024-05-23

## 2024-05-23 RX ORDER — ALBUTEROL SULFATE 0.83 MG/ML
2.5 SOLUTION RESPIRATORY (INHALATION) ONCE
Status: DISCONTINUED | OUTPATIENT
Start: 2024-05-23 | End: 2024-05-23 | Stop reason: HOSPADM

## 2024-05-23 RX ORDER — MEPERIDINE HYDROCHLORIDE 25 MG/ML
12.5 INJECTION INTRAMUSCULAR; INTRAVENOUS; SUBCUTANEOUS EVERY 10 MIN PRN
Status: DISCONTINUED | OUTPATIENT
Start: 2024-05-23 | End: 2024-05-23 | Stop reason: HOSPADM

## 2024-05-23 RX ORDER — ETOMIDATE 2 MG/ML
INJECTION INTRAVENOUS AS NEEDED
Status: DISCONTINUED | OUTPATIENT
Start: 2024-05-23 | End: 2024-05-23

## 2024-05-23 RX ORDER — SODIUM CHLORIDE, SODIUM LACTATE, POTASSIUM CHLORIDE, CALCIUM CHLORIDE 600; 310; 30; 20 MG/100ML; MG/100ML; MG/100ML; MG/100ML
100 INJECTION, SOLUTION INTRAVENOUS CONTINUOUS
Status: DISCONTINUED | OUTPATIENT
Start: 2024-05-23 | End: 2024-05-23 | Stop reason: HOSPADM

## 2024-05-23 RX ORDER — PROPOFOL 10 MG/ML
INJECTION, EMULSION INTRAVENOUS AS NEEDED
Status: DISCONTINUED | OUTPATIENT
Start: 2024-05-23 | End: 2024-05-23

## 2024-05-23 RX ORDER — MIDAZOLAM HYDROCHLORIDE 1 MG/ML
1 INJECTION, SOLUTION INTRAMUSCULAR; INTRAVENOUS ONCE AS NEEDED
Status: DISCONTINUED | OUTPATIENT
Start: 2024-05-23 | End: 2024-05-23 | Stop reason: HOSPADM

## 2024-05-23 RX ORDER — FENTANYL CITRATE 50 UG/ML
INJECTION, SOLUTION INTRAMUSCULAR; INTRAVENOUS AS NEEDED
Status: DISCONTINUED | OUTPATIENT
Start: 2024-05-23 | End: 2024-05-23

## 2024-05-23 RX ORDER — DROPERIDOL 2.5 MG/ML
0.62 INJECTION, SOLUTION INTRAMUSCULAR; INTRAVENOUS ONCE AS NEEDED
Status: DISCONTINUED | OUTPATIENT
Start: 2024-05-23 | End: 2024-05-23 | Stop reason: HOSPADM

## 2024-05-23 RX ORDER — ROCURONIUM BROMIDE 10 MG/ML
INJECTION, SOLUTION INTRAVENOUS AS NEEDED
Status: DISCONTINUED | OUTPATIENT
Start: 2024-05-23 | End: 2024-05-23

## 2024-05-23 RX ORDER — CIPROFLOXACIN 2 MG/ML
400 INJECTION, SOLUTION INTRAVENOUS ONCE
Status: COMPLETED | OUTPATIENT
Start: 2024-05-23 | End: 2024-05-23

## 2024-05-23 RX ORDER — WATER 1 ML/ML
IRRIGANT IRRIGATION AS NEEDED
Status: DISCONTINUED | OUTPATIENT
Start: 2024-05-23 | End: 2024-05-23 | Stop reason: HOSPADM

## 2024-05-23 RX ORDER — PHENYLEPHRINE HCL IN 0.9% NACL 1 MG/10 ML
SYRINGE (ML) INTRAVENOUS AS NEEDED
Status: DISCONTINUED | OUTPATIENT
Start: 2024-05-23 | End: 2024-05-23

## 2024-05-23 RX ORDER — LIDOCAINE HYDROCHLORIDE 10 MG/ML
0.1 INJECTION, SOLUTION EPIDURAL; INFILTRATION; INTRACAUDAL; PERINEURAL ONCE
Status: DISCONTINUED | OUTPATIENT
Start: 2024-05-23 | End: 2024-05-23 | Stop reason: HOSPADM

## 2024-05-23 RX ORDER — MIDAZOLAM HYDROCHLORIDE 1 MG/ML
INJECTION, SOLUTION INTRAMUSCULAR; INTRAVENOUS AS NEEDED
Status: DISCONTINUED | OUTPATIENT
Start: 2024-05-23 | End: 2024-05-23

## 2024-05-23 RX ORDER — LABETALOL HYDROCHLORIDE 5 MG/ML
5 INJECTION, SOLUTION INTRAVENOUS ONCE AS NEEDED
Status: DISCONTINUED | OUTPATIENT
Start: 2024-05-23 | End: 2024-05-23 | Stop reason: HOSPADM

## 2024-05-23 RX ORDER — OXYCODONE HYDROCHLORIDE 5 MG/1
5 TABLET ORAL EVERY 4 HOURS PRN
Status: CANCELLED | OUTPATIENT
Start: 2024-05-23

## 2024-05-23 RX ORDER — GLYCOPYRROLATE 0.2 MG/ML
INJECTION INTRAMUSCULAR; INTRAVENOUS AS NEEDED
Status: DISCONTINUED | OUTPATIENT
Start: 2024-05-23 | End: 2024-05-23

## 2024-05-23 RX ORDER — NITROFURANTOIN 25; 75 MG/1; MG/1
100 CAPSULE ORAL 2 TIMES DAILY
Qty: 6 CAPSULE | Refills: 0 | Status: SHIPPED | OUTPATIENT
Start: 2024-05-23 | End: 2024-05-26

## 2024-05-23 RX ORDER — ONDANSETRON HYDROCHLORIDE 2 MG/ML
INJECTION, SOLUTION INTRAVENOUS AS NEEDED
Status: DISCONTINUED | OUTPATIENT
Start: 2024-05-23 | End: 2024-05-23

## 2024-05-23 RX ADMIN — DEXAMETHASONE SODIUM PHOSPHATE 8 MG: 4 INJECTION, SOLUTION INTRAMUSCULAR; INTRAVENOUS at 08:17

## 2024-05-23 RX ADMIN — GLYCOPYRROLATE 0.4 MG: 0.2 INJECTION, SOLUTION INTRAMUSCULAR; INTRAVENOUS at 08:26

## 2024-05-23 RX ADMIN — FENTANYL CITRATE 50 MCG: 50 INJECTION, SOLUTION INTRAMUSCULAR; INTRAVENOUS at 08:20

## 2024-05-23 RX ADMIN — FENTANYL CITRATE 50 MCG: 50 INJECTION, SOLUTION INTRAMUSCULAR; INTRAVENOUS at 07:44

## 2024-05-23 RX ADMIN — LIDOCAINE HYDROCHLORIDE 5 ML: 20 INJECTION, SOLUTION INFILTRATION; PERINEURAL at 07:44

## 2024-05-23 RX ADMIN — PROPOFOL 100 MG: 10 INJECTION, EMULSION INTRAVENOUS at 08:20

## 2024-05-23 RX ADMIN — CIPROFLOXACIN 400 MG: 400 INJECTION, SOLUTION INTRAVENOUS at 07:01

## 2024-05-23 RX ADMIN — SUGAMMADEX 200 MG: 100 INJECTION, SOLUTION INTRAVENOUS at 08:34

## 2024-05-23 RX ADMIN — Medication 100 MCG: at 07:51

## 2024-05-23 RX ADMIN — Medication 100 MCG: at 07:59

## 2024-05-23 RX ADMIN — Medication 100 MCG: at 08:09

## 2024-05-23 RX ADMIN — Medication 300 MCG: at 08:23

## 2024-05-23 RX ADMIN — GLYCOPYRROLATE 0.4 MG: 0.2 INJECTION, SOLUTION INTRAMUSCULAR; INTRAVENOUS at 08:25

## 2024-05-23 RX ADMIN — MIDAZOLAM 2 MG: 1 INJECTION INTRAMUSCULAR; INTRAVENOUS at 07:29

## 2024-05-23 RX ADMIN — ONDANSETRON 4 MG: 2 INJECTION, SOLUTION INTRAMUSCULAR; INTRAVENOUS at 08:17

## 2024-05-23 RX ADMIN — SODIUM CHLORIDE, POTASSIUM CHLORIDE, SODIUM LACTATE AND CALCIUM CHLORIDE 100 ML/HR: 600; 310; 30; 20 INJECTION, SOLUTION INTRAVENOUS at 05:54

## 2024-05-23 RX ADMIN — ROCURONIUM BROMIDE 40 MG: 10 SOLUTION INTRAVENOUS at 07:44

## 2024-05-23 RX ADMIN — PROPOFOL 100 MG: 10 INJECTION, EMULSION INTRAVENOUS at 07:44

## 2024-05-23 ASSESSMENT — PAIN SCALES - GENERAL
PAINLEVEL_OUTOF10: 0 - NO PAIN
PAIN_LEVEL: 1

## 2024-05-23 ASSESSMENT — PAIN - FUNCTIONAL ASSESSMENT
PAIN_FUNCTIONAL_ASSESSMENT: 0-10

## 2024-05-23 ASSESSMENT — COLUMBIA-SUICIDE SEVERITY RATING SCALE - C-SSRS
1. IN THE PAST MONTH, HAVE YOU WISHED YOU WERE DEAD OR WISHED YOU COULD GO TO SLEEP AND NOT WAKE UP?: NO
6. HAVE YOU EVER DONE ANYTHING, STARTED TO DO ANYTHING, OR PREPARED TO DO ANYTHING TO END YOUR LIFE?: NO
2. HAVE YOU ACTUALLY HAD ANY THOUGHTS OF KILLING YOURSELF?: NO

## 2024-05-23 NOTE — ANESTHESIA POSTPROCEDURE EVALUATION
Patient: Peyman Khanna    Procedure Summary       Date: 05/23/24 Room / Location: [facility] / [facility]    Anesthesia Start: 0729 Anesthesia Stop: 0843    Procedures:       Ureteral Lithotripsy Laser (Right)      URETEROSCOPY WITH STONE REMOVAL (Right) Diagnosis:       Nephrolithiasis      Ureteral calculus      Hydronephrosis, unspecified hydronephrosis type      Flank pain      Urinary tract infection with hematuria, site unspecified      Bladder instability      (Nephrolithiasis [N20.0])      (Ureteral calculus [N20.1])      (Hydronephrosis, unspecified hydronephrosis type [N13.30])      (Flank pain [R10.9])      (Urinary tract infection with hematuria, site unspecified [N39.0, R31.9])      (Bladder instability [N32.89])    Surgeons: Jose Red MD Responsible Provider: Raymundo Chowdary MD    Anesthesia Type: general ASA Status: 3            Anesthesia Type: general    Vitals Value Taken Time   /71 05/23/24 0851   Temp 36 °C (96.8 °F) 05/23/24 0843   Pulse 76 05/23/24 0855   Resp 15 05/23/24 0855   SpO2 100 % 05/23/24 0855   Vitals shown include unfiled device data.    Anesthesia Post Evaluation    Patient location during evaluation: PACU  Patient participation: complete - patient participated  Level of consciousness: awake and alert  Pain score: 1  Pain management: satisfactory to patient  Airway patency: patent  Cardiovascular status: stable  Respiratory status: acceptable  Hydration status: acceptable  Postoperative Nausea and Vomiting: none        No notable events documented.

## 2024-05-23 NOTE — DISCHARGE INSTRUCTIONS
General Anesthesia Discharge Instructions    About this topic  You may need general anesthesia if you need to be asleep during a procedure. Your doctor will use drugs to block the signals that go from your nerves to your brain. Doctors give general anesthesia during a surgery or procedure to:  Allow you to sleep  Help your body be still  Relax your muscles  Help you to relax and be pain free  Keep you from remembering the surgery  Let the doctor manage your airway, breathing, and blood flow  The doctor or nurse anesthetist gives general anesthesia by a shot into your vein. Sometimes, you may breathe in a gas through a mask placed over your face.  What care is needed at home?  Ask your doctor what you need to do when you go home. Make sure you ask questions if you do not understand what the doctor says.  Your doctor may give you drugs to prevent or treat an upset stomach from the anesthetic. Take them as ordered.  If your throat is sore, suck on ice chips or popsicles to ease throat pain.  Put 2 to 3 pillows under your head and back when you lie down to help you breathe easier.  For the first 24 to 48 hours:  Do not operate heavy or dangerous machinery.  Do not make major decisions or sign important papers. You may not be able to think clearly.  Avoid beer, wine, or mixed drinks.  You are at a higher risk of falling for at least 24 hours after general anesthesia.  Take extra care when you get up.  Do not change positions quickly.  Do not rush when you need to go to the bathroom or to answer the phone.  Ask for help if you feel unsteady when you try to walk.  Wear shoes with non-slip soles and low heels.  What follow-up care is needed?  Your doctor may ask you to come back to the office to check on your progress. Be sure to keep these visits.  If you have stitches that do not dissolve or staples, you will need to have them removed. Your doctor will want to do this in 1 to 2 weeks. If the doctor used skin glue, the  glue will fall off on its own.  What drugs may be needed?  The doctor may order drugs to:  Help with pain  Treat an upset stomach or throwing up  Will physical activity be limited?  You will not be allowed to drive right away after the procedure. Ask a family member or a friend to drive you home.  Avoid trying to get out of bed without help until you are sure of your balance.  You may have to limit your activity. Talk to your doctor about if you need to limit how much you lift or limit exercise after your procedure.  What changes to diet are needed?  Start with a light diet when you are fully awake. This includes things that are easy to swallow like soups, pudding, jello, toast, and eggs. Slowly progress to your normal diet.  What problems could happen?  Low blood pressure  Breathing problems  Upset stomach or throwing up  Dizziness  Blood clots  Infection  When do I need to call the doctor?  Trouble breathing  Upset stomach or throwing up more than 3 times in the next 2 days  Dizziness  Teach Back: Helping You Understand  The Teach Back Method helps you understand the information we are giving you. After you talk with the staff, tell them in your own words what you learned. This helps to make sure the staff has described each thing clearly. It also helps to explain things that may have been confusing. Before going home, make sure you can do these:  I can tell you about my procedure.  I can tell you if I need to follow up with my doctor.  I can tell you what is good for me to eat and drink the next day.  I can tell you what I would do if I have trouble breathing, an upset stomach, or dizziness.  Where can I learn more?  [employer]  https://www.nigms.nih.gov/education/pages/factsheet_Anesthesia.aspx  [facility] Choices  http://www.nhs.uk/conditions/Anaesthetic-general/Pages/Definition.aspx  Last Reviewed Date  2020-04-22    Laser Lithotripsy for Kidney Stones Discharge  Instructions    About this topic  Laser lithotripsy is a procedure to break up kidney stones without cutting the skin. The stone is broken down into tiny sand-like pieces and flushed out of the urinary tract.  The urinary tract is made up of the kidney, ureters, bladder, and urethra. The kidneys make urine and it drains down into tubes called ureters. These ureters are connected to the bladder. The bladder then squeezes out the urine and it exits the body through the urethra.  Sometimes, salts and minerals in your urine build up and form stones. The stones are hard and can get stuck on their way out of the body. Some stones are too large and block the flow of urine. Others cause bleeding and pain. They may damage the kidney. These stones need a procedure like laser lithotripsy to break them up.    What care is needed at home?  Ask your doctor what you need to do when you go home. Make sure you ask questions if you do not understand what the doctor says. This way you will know what you need to do.  Ask your doctor when you should resume taking blood-thinning drugs or aspirin.  Pieces of the kidney stone may pass in the urine for a few days and cause mild pain. Your doctor may give you drugs for the pain. Take the drugs as ordered by your doctor.  Drink 8 to 10 glasses of water each day. This will help flush the broken kidney stones out.  Your doctor may ask you to strain your urine by using a filter. This will hold the stone pieces that will be tested.  What follow-up care is needed?  Your doctor may ask you to make visits to the office to check on your progress. Be sure to keep these visits.  If you have a stent, your doctor may want to take it out or may teach you how to take it out.  If you were asked to filter your urine, bring the collected stones on your next visit.  What drugs may be needed?  The doctor may order drugs to:  Help with pain  Prevent infection  Relax smooth muscles in your ureter to help flush  out kidney stones  Prevent kidney stones  Treat bladder spasms  Will physical activity be limited?  You may have to limit your activity for a while. Talk to your doctor about the right amount of activity for you.  What changes to diet are needed?  Talk to your doctor or dietitian about your personal diet plan to prevent more stones. Ask if there are foods you should avoid.  What problems could happen?  Pain while pieces of the kidney stone pass  Pain or irritation from the stent, especially when urinating  Blocked urine flow if stone fragments are too big to pass  Kidney or ureter injury  High blood pressure  Fevers or urinary tract infection  Blood in the urine  Not able to pass urine  Bruising  Discomfort in the back or belly  What can be done to prevent this health problem?  Prevent or treat urinary tract infections.  Drink lots of water during the day and evening. When you have less fluid in your body, urine becomes concentrated. This increases your chance of kidney stones.  Follow the diet plan your dietitian gives you to prevent kidney stones.  Limit foods or drugs that may cause kidney stones.  When do I need to call the doctor?  Signs of infection. These include a fever of 100.4°F (38°C) or higher, chills, pain or burning with passing urine.  Very bad pain in your back or side that will not go away  Throwing up  Urine smells bad, looks cloudy, or has blood in it  No urine for more than 6 hours  Very bad pain in your chest, shoulder, or belly  More swelling of your ankles, legs, and hands or tightness with your shoes or rings  Teach Back: Helping You Understand  The Teach Back Method helps you understand the information we are giving you. After you talk with the staff, tell them in your own words what you learned. This helps to make sure the staff has described each thing clearly. It also helps to explain things that may have been confusing. Before going home, make sure you can do these:  I can tell you about  my procedure.  I can tell you how to strain my urine for pieces of stones.  I can tell you what I will do if I have fever, pain in my back or side that will not go away, no urine or changes to my urine, or swelling.  Last Reviewed Date  2020-01-14

## 2024-05-23 NOTE — ANESTHESIA PROCEDURE NOTES
Airway  Date/Time: 5/23/2024 7:45 AM  Urgency: elective      Staffing  Performed: attending   Authorized by: Raymundo Chowdary MD    Performed by: Raymundo Chowdary MD  Patient location during procedure: OR    Indications and Patient Condition  Indications for airway management: anesthesia  Spontaneous ventilation: present  Sedation level: deep  Preoxygenated: yes  MILS not maintained throughout  Mask difficulty assessment: 0 - not attempted  Planned trial extubation    Final Airway Details  Final airway type: endotracheal airway      Successful airway: ETT  Cuffed: yes   Successful intubation technique: video laryngoscopy  Facilitating devices/methods: intubating stylet  Endotracheal tube insertion site: oral  Blade: Sebastian  Blade size: #3  ETT size (mm): 7.5  Cormack-Lehane Classification: grade I - full view of glottis  Placement verified by: capnometry and palpation of cuff   Measured from: gums  ETT to gums (cm): 23  Number of attempts at approach: 1  Number of other approaches attempted: 1

## 2024-05-23 NOTE — OP NOTE
Ureteral Lithotripsy Laser (R), URETEROSCOPY WITH STONE REMOVAL (R) Operative Note     Date: 2024  OR Location: [facility]    Name: Peyman Khanna, : 1955, Age: 69 y.o., MRN: [id number], Sex: male    Diagnosis  Pre-op Diagnosis     * Nephrolithiasis [N20.0]     * Ureteral calculus [N20.1]     * Hydronephrosis, unspecified hydronephrosis type [N13.30]     * Flank pain [R10.9]     * Urinary tract infection with hematuria, site unspecified [N39.0, R31.9]     * Bladder instability [N32.89] Post-op Diagnosis     * Nephrolithiasis [N20.0]     * Ureteral calculus [N20.1]     * Hydronephrosis, unspecified hydronephrosis type [N13.30]     * Flank pain [R10.9]     * Urinary tract infection with hematuria, site unspecified [N39.0, R31.9]     * Bladder instability [N32.89]     Procedures  Ureteral Lithotripsy Laser  91231 - HI CYSTO W/URETEROSCOPY W/LITHOTRIPSY    URETEROSCOPY WITH STONE REMOVAL  58568 - HI CYSTO W/INSERT URETERAL STENT      Surgeons      * Jose Red - Primary    Resident/Fellow/Other Assistant:  Surgeons and Role:  * No surgeons found with a matching role *    Procedure Summary  Anesthesia: General  ASA: III  Anesthesia Staff: Anesthesiologist: Raymundo Chowdary MD  Estimated Blood Loss: 50 cc mL  Intra-op Medications:   Administrations occurring from 0730 to 0930 on 24:   Medication Name Total Dose   sterile water irrigation solution 3,000 mL   mineral oil, light topical 1 Application              Anesthesia Record               Intraprocedure I/O Totals       None           Specimen:   ID Type Source Tests Collected by Time   A : RIGHT URETER CALCULI Calculus Ureter, Right CALCULI (STONE) ANALYSIS Jose Red MD 2024 0824        Staff:   Circulator: Taylor  Scrub Person: Yolette         Drains and/or Catheters: * None in log *    Tourniquet Times:         Implants: None    Findings: 9 mm right ureteral calculus    Indications: Peyman Khanna is an 69 y.o. male who is having surgery  for Nephrolithiasis [N20.0]  Ureteral calculus [N20.1]  Hydronephrosis, unspecified hydronephrosis type [N13.30]  Flank pain [R10.9]  Urinary tract infection with hematuria, site unspecified [N39.0, R31.9]  Bladder instability [N32.89].  69-year-old white male with a history of stone disease.  April 29, 2024 seen in my office.  Complaining of 4/10 right lower quadrant pain.  CT urogram identified an 8 mm right UVJ stone.  Images were reviewed with the patient.  Patient is scheduled for ureteroscopy with laser lithotripsy.  Preoperative cardiac risk stratification was provided by Dr. Riri Aranda    The patient was seen in the preoperative area. The risks, benefits, complications, treatment options, non-operative alternatives, expected recovery and outcomes were discussed with the patient. The possibilities of reaction to medication, pulmonary aspiration, injury to surrounding structures, bleeding, recurrent infection, the need for additional procedures, failure to diagnose a condition, and creating a complication requiring transfusion or operation were discussed with the patient. The patient concurred with the proposed plan, giving informed consent.  The site of surgery was properly noted/marked if necessary per policy. The patient has been actively warmed in preoperative area. Preoperative antibiotics have been ordered and given within 1 hours of incision. Venous thrombosis prophylaxis are not indicated.    Procedure Details: The #22 Israeli rigid cystoscope was inserted through the penile urethra to the level of the bladder without incident.  Once inside the bladder, both ureteral orifices were identified.  Full examination of the bladder did not reveal any evidence of stones, lesions or tumors.    6 Israeli open-ended ureteral catheter was cannulated into the right ureteral orifice and a retropyelogram was performed.  This identified a large filling defect in the distal ureter.  Cystoscope was removed.    The 8  Irish semirigid ureteroscope was introduced.  With the use of a 0.035 inch guidewire, the right ureter was cannulated.  A large 9 mm distal ureteral calculus was identified.  The ureteroscope was removed with the guidewire remaining in place.  Ureteroscope was reinserted with the use of a 360 holmium laser fiber, the stone was fragmented.  The laser fiber was removed.    With the use of the grasping forceps, multiple stone fragments were engaged and removed.  Each fragment was removed separately and all fragments were sent collectively for metabolic analysis.  Ureteroscope was then advanced to the level of the renal pelvis identifying calyces within the kidney.  No evidence of any further stones remained.  The ureteroscope and guidewire were removed.    Patient tolerated the procedure well was transferred to the postanesthesia care unit alert, awake, and in good condition.  We will await metabolic analysis of the stone and postoperative imaging studies.     This note was created with voice-recognition software and was not corrected for typographical or grammatical errors.    Complications:  None; patient tolerated the procedure well.    Disposition: PACU - hemodynamically stable.  Condition: stable         Additional Details:     Attending Attestation: I performed the procedure.    Jose Red  Phone Number: [phone]

## 2024-05-28 LAB
APPEARANCE STONE: NORMAL
COMPN STONE: NORMAL
SPECIMEN WT: 78 MG

## 2024-05-29 ENCOUNTER — HOSPITAL ENCOUNTER (OUTPATIENT)
Dept: RADIOLOGY | Facility: HOSPITAL | Age: 69
Discharge: HOME | End: 2024-05-29
Payer: COMMERCIAL

## 2024-05-29 DIAGNOSIS — N20.0 CALCULUS OF KIDNEY: ICD-10-CM

## 2024-05-29 PROCEDURE — 74021 RADEX ABDOMEN 3+ VIEWS: CPT

## 2024-05-29 PROCEDURE — 74019 RADEX ABDOMEN 2 VIEWS: CPT

## 2024-06-25 ENCOUNTER — LAB (OUTPATIENT)
Dept: LAB | Facility: LAB | Age: 69
End: 2024-06-25
Payer: COMMERCIAL

## 2024-06-25 DIAGNOSIS — N18.32 CHRONIC KIDNEY DISEASE, STAGE 3B (MULTI): Primary | ICD-10-CM

## 2024-06-25 DIAGNOSIS — E11.22 TYPE 2 DIABETES MELLITUS WITH DIABETIC CHRONIC KIDNEY DISEASE (MULTI): ICD-10-CM

## 2024-06-25 DIAGNOSIS — I12.9 HYPERTENSIVE CHRONIC KIDNEY DISEASE WITH STAGE 1 THROUGH STAGE 4 CHRONIC KIDNEY DISEASE, OR UNSPECIFIED CHRONIC KIDNEY DISEASE: ICD-10-CM

## 2024-06-25 LAB
ANION GAP SERPL CALC-SCNC: 11 MMOL/L (ref 10–20)
BASOPHILS # BLD AUTO: 0.05 X10*3/UL (ref 0–0.1)
BASOPHILS NFR BLD AUTO: 0.9 %
BUN SERPL-MCNC: 24 MG/DL (ref 6–23)
CALCIUM SERPL-MCNC: 9.5 MG/DL (ref 8.6–10.3)
CHLORIDE SERPL-SCNC: 106 MMOL/L (ref 98–107)
CO2 SERPL-SCNC: 28 MMOL/L (ref 21–32)
CREAT SERPL-MCNC: 1.98 MG/DL (ref 0.5–1.3)
EGFRCR SERPLBLD CKD-EPI 2021: 35 ML/MIN/1.73M*2
EOSINOPHIL # BLD AUTO: 0.2 X10*3/UL (ref 0–0.4)
EOSINOPHIL NFR BLD AUTO: 3.6 %
ERYTHROCYTE [DISTWIDTH] IN BLOOD BY AUTOMATED COUNT: 13.8 % (ref 11.5–14.5)
GLUCOSE SERPL-MCNC: 203 MG/DL (ref 74–99)
HCT VFR BLD AUTO: 44.1 % (ref 41–52)
HGB BLD-MCNC: 14.3 G/DL (ref 13.5–17.5)
IMM GRANULOCYTES # BLD AUTO: 0.02 X10*3/UL (ref 0–0.5)
IMM GRANULOCYTES NFR BLD AUTO: 0.4 % (ref 0–0.9)
LYMPHOCYTES # BLD AUTO: 1.16 X10*3/UL (ref 0.8–3)
LYMPHOCYTES NFR BLD AUTO: 21.1 %
MCH RBC QN AUTO: 31.2 PG (ref 26–34)
MCHC RBC AUTO-ENTMCNC: 32.4 G/DL (ref 32–36)
MCV RBC AUTO: 96 FL (ref 80–100)
MONOCYTES # BLD AUTO: 0.65 X10*3/UL (ref 0.05–0.8)
MONOCYTES NFR BLD AUTO: 11.8 %
NEUTROPHILS # BLD AUTO: 3.42 X10*3/UL (ref 1.6–5.5)
NEUTROPHILS NFR BLD AUTO: 62.2 %
NRBC BLD-RTO: 0 /100 WBCS (ref 0–0)
PHOSPHATE SERPL-MCNC: 3.4 MG/DL (ref 2.5–4.9)
PLATELET # BLD AUTO: 149 X10*3/UL (ref 150–450)
POTASSIUM SERPL-SCNC: 4.7 MMOL/L (ref 3.5–5.3)
PTH-INTACT SERPL-MCNC: 58 PG/ML (ref 18.5–88)
RBC # BLD AUTO: 4.59 X10*6/UL (ref 4.5–5.9)
SODIUM SERPL-SCNC: 140 MMOL/L (ref 136–145)
WBC # BLD AUTO: 5.5 X10*3/UL (ref 4.4–11.3)

## 2024-06-25 PROCEDURE — 80048 BASIC METABOLIC PNL TOTAL CA: CPT

## 2024-06-25 PROCEDURE — 85025 COMPLETE CBC W/AUTO DIFF WBC: CPT

## 2024-06-25 PROCEDURE — 83970 ASSAY OF PARATHORMONE: CPT

## 2024-06-25 PROCEDURE — 36415 COLL VENOUS BLD VENIPUNCTURE: CPT

## 2024-06-25 PROCEDURE — 84100 ASSAY OF PHOSPHORUS: CPT

## 2024-07-17 ENCOUNTER — LAB (OUTPATIENT)
Dept: LAB | Facility: LAB | Age: 69
End: 2024-07-17
Payer: COMMERCIAL

## 2024-07-17 DIAGNOSIS — E78.00 PURE HYPERCHOLESTEROLEMIA, UNSPECIFIED: ICD-10-CM

## 2024-07-17 DIAGNOSIS — I10 ESSENTIAL (PRIMARY) HYPERTENSION: ICD-10-CM

## 2024-07-17 DIAGNOSIS — E11.9 TYPE 2 DIABETES MELLITUS WITHOUT COMPLICATIONS (MULTI): Primary | ICD-10-CM

## 2024-07-17 LAB
ALBUMIN SERPL BCP-MCNC: 4.2 G/DL (ref 3.4–5)
ALP SERPL-CCNC: 55 U/L (ref 33–136)
ALT SERPL W P-5'-P-CCNC: 19 U/L (ref 10–52)
ANION GAP SERPL CALC-SCNC: 12 MMOL/L (ref 10–20)
APPEARANCE UR: CLEAR
AST SERPL W P-5'-P-CCNC: 19 U/L (ref 9–39)
BASOPHILS # BLD AUTO: 0.05 X10*3/UL (ref 0–0.1)
BASOPHILS NFR BLD AUTO: 0.9 %
BILIRUB SERPL-MCNC: 0.6 MG/DL (ref 0–1.2)
BILIRUB UR STRIP.AUTO-MCNC: NEGATIVE MG/DL
BUN SERPL-MCNC: 24 MG/DL (ref 6–23)
CALCIUM SERPL-MCNC: 10.1 MG/DL (ref 8.6–10.3)
CHLORIDE SERPL-SCNC: 108 MMOL/L (ref 98–107)
CHOLEST SERPL-MCNC: 104 MG/DL (ref 0–199)
CHOLESTEROL/HDL RATIO: 2.7
CO2 SERPL-SCNC: 27 MMOL/L (ref 21–32)
COLOR UR: NORMAL
CREAT SERPL-MCNC: 2.19 MG/DL (ref 0.5–1.3)
CREAT UR-MCNC: 207 MG/DL (ref 20–370)
EGFRCR SERPLBLD CKD-EPI 2021: 31 ML/MIN/1.73M*2
EOSINOPHIL # BLD AUTO: 0.18 X10*3/UL (ref 0–0.4)
EOSINOPHIL NFR BLD AUTO: 3.1 %
ERYTHROCYTE [DISTWIDTH] IN BLOOD BY AUTOMATED COUNT: 13.1 % (ref 11.5–14.5)
EST. AVERAGE GLUCOSE BLD GHB EST-MCNC: 171 MG/DL
GLUCOSE SERPL-MCNC: 96 MG/DL (ref 74–99)
GLUCOSE UR STRIP.AUTO-MCNC: NORMAL MG/DL
HBA1C MFR BLD: 7.6 %
HCT VFR BLD AUTO: 44.9 % (ref 41–52)
HDLC SERPL-MCNC: 38.7 MG/DL
HGB BLD-MCNC: 14.6 G/DL (ref 13.5–17.5)
IMM GRANULOCYTES # BLD AUTO: 0.05 X10*3/UL (ref 0–0.5)
IMM GRANULOCYTES NFR BLD AUTO: 0.9 % (ref 0–0.9)
KETONES UR STRIP.AUTO-MCNC: NEGATIVE MG/DL
LDLC SERPL CALC-MCNC: 52 MG/DL
LEUKOCYTE ESTERASE UR QL STRIP.AUTO: NEGATIVE
LYMPHOCYTES # BLD AUTO: 1.4 X10*3/UL (ref 0.8–3)
LYMPHOCYTES NFR BLD AUTO: 24.2 %
MCH RBC QN AUTO: 30.9 PG (ref 26–34)
MCHC RBC AUTO-ENTMCNC: 32.5 G/DL (ref 32–36)
MCV RBC AUTO: 95 FL (ref 80–100)
MICROALBUMIN UR-MCNC: 39.2 MG/L
MICROALBUMIN/CREAT UR: 18.9 UG/MG CREAT
MONOCYTES # BLD AUTO: 0.78 X10*3/UL (ref 0.05–0.8)
MONOCYTES NFR BLD AUTO: 13.5 %
MUCOUS THREADS #/AREA URNS AUTO: NORMAL /LPF
NEUTROPHILS # BLD AUTO: 3.32 X10*3/UL (ref 1.6–5.5)
NEUTROPHILS NFR BLD AUTO: 57.4 %
NITRITE UR QL STRIP.AUTO: NEGATIVE
NON HDL CHOLESTEROL: 65 MG/DL (ref 0–149)
NRBC BLD-RTO: 0 /100 WBCS (ref 0–0)
PH UR STRIP.AUTO: 5.5 [PH]
PLATELET # BLD AUTO: 144 X10*3/UL (ref 150–450)
POTASSIUM SERPL-SCNC: 5.4 MMOL/L (ref 3.5–5.3)
PROT SERPL-MCNC: 6.7 G/DL (ref 6.4–8.2)
PROT UR STRIP.AUTO-MCNC: NORMAL MG/DL
RBC # BLD AUTO: 4.72 X10*6/UL (ref 4.5–5.9)
RBC # UR STRIP.AUTO: NEGATIVE /UL
RBC #/AREA URNS AUTO: NORMAL /HPF
SODIUM SERPL-SCNC: 142 MMOL/L (ref 136–145)
SP GR UR STRIP.AUTO: 1.02
SQUAMOUS #/AREA URNS AUTO: NORMAL /HPF
TRIGL SERPL-MCNC: 65 MG/DL (ref 0–149)
TSH SERPL-ACNC: 3.02 MIU/L (ref 0.44–3.98)
UROBILINOGEN UR STRIP.AUTO-MCNC: NORMAL MG/DL
VLDL: 13 MG/DL (ref 0–40)
WBC # BLD AUTO: 5.8 X10*3/UL (ref 4.4–11.3)
WBC #/AREA URNS AUTO: NORMAL /HPF

## 2024-07-17 PROCEDURE — 80061 LIPID PANEL: CPT

## 2024-07-17 PROCEDURE — 85025 COMPLETE CBC W/AUTO DIFF WBC: CPT

## 2024-07-17 PROCEDURE — 82570 ASSAY OF URINE CREATININE: CPT

## 2024-07-17 PROCEDURE — 80053 COMPREHEN METABOLIC PANEL: CPT

## 2024-07-17 PROCEDURE — 81001 URINALYSIS AUTO W/SCOPE: CPT

## 2024-07-17 PROCEDURE — 36415 COLL VENOUS BLD VENIPUNCTURE: CPT

## 2024-07-17 PROCEDURE — 83036 HEMOGLOBIN GLYCOSYLATED A1C: CPT

## 2024-07-17 PROCEDURE — 82043 UR ALBUMIN QUANTITATIVE: CPT

## 2024-07-17 PROCEDURE — 84443 ASSAY THYROID STIM HORMONE: CPT

## 2024-08-09 DIAGNOSIS — E11.65 TYPE 2 DIABETES MELLITUS WITH HYPERGLYCEMIA, WITHOUT LONG-TERM CURRENT USE OF INSULIN (HCC): ICD-10-CM

## 2024-08-09 LAB
ANION GAP SERPL CALCULATED.3IONS-SCNC: 10 MEQ/L (ref 9–15)
BUN SERPL-MCNC: 19 MG/DL (ref 8–23)
CALCIUM SERPL-MCNC: 9.2 MG/DL (ref 8.5–9.9)
CHLORIDE SERPL-SCNC: 107 MEQ/L (ref 95–107)
CO2 SERPL-SCNC: 23 MEQ/L (ref 20–31)
CREAT SERPL-MCNC: 2.04 MG/DL (ref 0.7–1.2)
CREAT UR-MCNC: 219.7 MG/DL
ESTIMATED AVERAGE GLUCOSE: 169 MG/DL
GLUCOSE SERPL-MCNC: 112 MG/DL (ref 70–99)
HBA1C MFR BLD: 7.5 % (ref 4–6)
MICROALBUMIN UR-MCNC: 4.7 MG/DL
MICROALBUMIN/CREAT UR-RTO: 21.4 MG/G (ref 0–30)
POTASSIUM SERPL-SCNC: 5.2 MEQ/L (ref 3.4–4.9)
SODIUM SERPL-SCNC: 140 MEQ/L (ref 135–144)

## 2024-08-22 ENCOUNTER — OFFICE VISIT (OUTPATIENT)
Dept: ENDOCRINOLOGY | Age: 69
End: 2024-08-22
Payer: MEDICARE

## 2024-08-22 VITALS
BODY MASS INDEX: 23.04 KG/M2 | OXYGEN SATURATION: 95 % | WEIGHT: 152 LBS | HEART RATE: 88 BPM | SYSTOLIC BLOOD PRESSURE: 107 MMHG | DIASTOLIC BLOOD PRESSURE: 65 MMHG | HEIGHT: 68 IN

## 2024-08-22 DIAGNOSIS — E11.65 TYPE 2 DIABETES MELLITUS WITH HYPERGLYCEMIA, WITHOUT LONG-TERM CURRENT USE OF INSULIN (HCC): Primary | ICD-10-CM

## 2024-08-22 LAB
CHP ED QC CHECK: NORMAL
GLUCOSE BLD-MCNC: 195 MG/DL

## 2024-08-22 PROCEDURE — 1123F ACP DISCUSS/DSCN MKR DOCD: CPT | Performed by: INTERNAL MEDICINE

## 2024-08-22 PROCEDURE — G8427 DOCREV CUR MEDS BY ELIG CLIN: HCPCS | Performed by: INTERNAL MEDICINE

## 2024-08-22 PROCEDURE — G8420 CALC BMI NORM PARAMETERS: HCPCS | Performed by: INTERNAL MEDICINE

## 2024-08-22 PROCEDURE — 2022F DILAT RTA XM EVC RTNOPTHY: CPT | Performed by: INTERNAL MEDICINE

## 2024-08-22 PROCEDURE — 3051F HG A1C>EQUAL 7.0%<8.0%: CPT | Performed by: INTERNAL MEDICINE

## 2024-08-22 PROCEDURE — 82962 GLUCOSE BLOOD TEST: CPT | Performed by: INTERNAL MEDICINE

## 2024-08-22 PROCEDURE — 99213 OFFICE O/P EST LOW 20 MIN: CPT | Performed by: INTERNAL MEDICINE

## 2024-08-22 PROCEDURE — 3017F COLORECTAL CA SCREEN DOC REV: CPT | Performed by: INTERNAL MEDICINE

## 2024-08-22 PROCEDURE — 1036F TOBACCO NON-USER: CPT | Performed by: INTERNAL MEDICINE

## 2024-08-22 ASSESSMENT — ENCOUNTER SYMPTOMS: EYES NEGATIVE: 1

## 2024-08-22 NOTE — PROGRESS NOTES
lisinopril (PRINIVIL;ZESTRIL) 5 MG tablet, , Disp: , Rfl:     atorvastatin (LIPITOR) 40 MG tablet, , Disp: , Rfl:     metoprolol tartrate (LOPRESSOR) 25 MG tablet, , Disp: , Rfl:     pantoprazole (PROTONIX) 40 MG tablet, , Disp: , Rfl:     calcitRIOL (ROCALTROL) 0.25 MCG capsule, , Disp: , Rfl:     ASPIRIN 81 PO, Take by mouth, Disp: , Rfl:   Lab Results   Component Value Date     08/09/2024    K 5.2 (H) 08/09/2024     08/09/2024    CO2 23 08/09/2024    BUN 19 08/09/2024    CREATININE 2.04 (H) 08/09/2024    GLUCOSE 112 (H) 08/09/2024    CALCIUM 9.2 08/09/2024    LABGLOM 34.1 (L) 08/09/2024     Lab Results   Component Value Date    WBC 6.5 06/20/2023    HGB 14.9 06/20/2023    HCT 45.7 06/20/2023    MCV 93 06/20/2023     06/20/2023     Lab Results   Component Value Date    LABA1C 7.5 (H) 08/09/2024    LABA1C 7.9 (H) 01/25/2024    LABA1C 7.7 (H) 08/16/2023     Lab Results   Component Value Date    HDL 34 (L) 03/16/2023    CHOL 113 03/16/2023    TRIG 107 03/16/2023     No results found for: \"TESTM\"  No results found for: \"TSH\", \"FT3\", \"T4FREE\"  No results found for: \"TPOABS\"    Review of Systems   Eyes: Negative.    Cardiovascular: Negative.    Endocrine: Negative for polyuria.   Skin:  Positive for rash.   All other systems reviewed and are negative.      Objective:   Physical Exam  Vitals reviewed.   Constitutional:       General: He is not in acute distress.     Appearance: Normal appearance.   HENT:      Head: Normocephalic and atraumatic.      Right Ear: External ear normal.      Left Ear: External ear normal.      Nose: Nose normal.   Eyes:      General: No scleral icterus.        Right eye: No discharge.         Left eye: No discharge.      Extraocular Movements: Extraocular movements intact.      Conjunctiva/sclera: Conjunctivae normal.   Cardiovascular:      Rate and Rhythm: Normal rate.   Pulmonary:      Effort: Pulmonary effort is normal.   Musculoskeletal:         General: Normal range of

## 2024-10-28 ENCOUNTER — LAB (OUTPATIENT)
Dept: LAB | Facility: LAB | Age: 69
End: 2024-10-28
Payer: COMMERCIAL

## 2024-10-28 DIAGNOSIS — E11.22 TYPE 2 DIABETES MELLITUS WITH DIABETIC CHRONIC KIDNEY DISEASE: ICD-10-CM

## 2024-10-28 DIAGNOSIS — N18.32 CHRONIC KIDNEY DISEASE, STAGE 3B (MULTI): Primary | ICD-10-CM

## 2024-10-28 DIAGNOSIS — I12.9 HYPERTENSIVE CHRONIC KIDNEY DISEASE WITH STAGE 1 THROUGH STAGE 4 CHRONIC KIDNEY DISEASE, OR UNSPECIFIED CHRONIC KIDNEY DISEASE: ICD-10-CM

## 2024-10-28 LAB
ANION GAP SERPL CALC-SCNC: 12 MMOL/L (ref 10–20)
BASOPHILS # BLD AUTO: 0.06 X10*3/UL (ref 0–0.1)
BASOPHILS NFR BLD AUTO: 1.1 %
BUN SERPL-MCNC: 30 MG/DL (ref 6–23)
CALCIUM SERPL-MCNC: 9.4 MG/DL (ref 8.6–10.3)
CHLORIDE SERPL-SCNC: 107 MMOL/L (ref 98–107)
CO2 SERPL-SCNC: 26 MMOL/L (ref 21–32)
CREAT SERPL-MCNC: 2.02 MG/DL (ref 0.5–1.3)
EGFRCR SERPLBLD CKD-EPI 2021: 35 ML/MIN/1.73M*2
EOSINOPHIL # BLD AUTO: 0.21 X10*3/UL (ref 0–0.4)
EOSINOPHIL NFR BLD AUTO: 3.7 %
ERYTHROCYTE [DISTWIDTH] IN BLOOD BY AUTOMATED COUNT: 13 % (ref 11.5–14.5)
EST. AVERAGE GLUCOSE BLD GHB EST-MCNC: 192 MG/DL
GLUCOSE SERPL-MCNC: 187 MG/DL (ref 74–99)
HBA1C MFR BLD: 8.3 %
HCT VFR BLD AUTO: 45 % (ref 41–52)
HGB BLD-MCNC: 14.5 G/DL (ref 13.5–17.5)
IMM GRANULOCYTES # BLD AUTO: 0.03 X10*3/UL (ref 0–0.5)
IMM GRANULOCYTES NFR BLD AUTO: 0.5 % (ref 0–0.9)
LYMPHOCYTES # BLD AUTO: 1.72 X10*3/UL (ref 0.8–3)
LYMPHOCYTES NFR BLD AUTO: 30.4 %
MCH RBC QN AUTO: 30.9 PG (ref 26–34)
MCHC RBC AUTO-ENTMCNC: 32.2 G/DL (ref 32–36)
MCV RBC AUTO: 96 FL (ref 80–100)
MONOCYTES # BLD AUTO: 0.47 X10*3/UL (ref 0.05–0.8)
MONOCYTES NFR BLD AUTO: 8.3 %
NEUTROPHILS # BLD AUTO: 3.17 X10*3/UL (ref 1.6–5.5)
NEUTROPHILS NFR BLD AUTO: 56 %
NRBC BLD-RTO: 0 /100 WBCS (ref 0–0)
PHOSPHATE SERPL-MCNC: 3.7 MG/DL (ref 2.5–4.9)
PLATELET # BLD AUTO: 170 X10*3/UL (ref 150–450)
POTASSIUM SERPL-SCNC: 5.1 MMOL/L (ref 3.5–5.3)
PTH-INTACT SERPL-MCNC: 79.9 PG/ML (ref 18.5–88)
RBC # BLD AUTO: 4.69 X10*6/UL (ref 4.5–5.9)
SODIUM SERPL-SCNC: 140 MMOL/L (ref 136–145)
WBC # BLD AUTO: 5.7 X10*3/UL (ref 4.4–11.3)

## 2024-10-28 PROCEDURE — 83970 ASSAY OF PARATHORMONE: CPT

## 2024-10-28 PROCEDURE — 83036 HEMOGLOBIN GLYCOSYLATED A1C: CPT

## 2024-10-28 PROCEDURE — 85025 COMPLETE CBC W/AUTO DIFF WBC: CPT

## 2024-10-28 PROCEDURE — 36415 COLL VENOUS BLD VENIPUNCTURE: CPT

## 2024-10-28 PROCEDURE — 80048 BASIC METABOLIC PNL TOTAL CA: CPT

## 2024-10-28 PROCEDURE — 84100 ASSAY OF PHOSPHORUS: CPT

## 2024-11-08 RX ORDER — INSULIN LISPRO 100 [IU]/ML
INJECTION, SUSPENSION SUBCUTANEOUS
Qty: 30 ML | Refills: 3 | Status: SHIPPED | OUTPATIENT
Start: 2024-11-08

## 2024-11-14 ENCOUNTER — APPOINTMENT (OUTPATIENT)
Dept: CARDIOLOGY | Facility: CLINIC | Age: 69
End: 2024-11-14
Payer: COMMERCIAL

## 2024-11-14 VITALS
BODY MASS INDEX: 23.98 KG/M2 | WEIGHT: 158.2 LBS | HEART RATE: 80 BPM | DIASTOLIC BLOOD PRESSURE: 84 MMHG | SYSTOLIC BLOOD PRESSURE: 120 MMHG | HEIGHT: 68 IN

## 2024-11-14 DIAGNOSIS — N18.31 CHRONIC KIDNEY DISEASE, STAGE 3A (MULTI): ICD-10-CM

## 2024-11-14 DIAGNOSIS — E11.22 TYPE 2 DIABETES MELLITUS WITH CHRONIC KIDNEY DISEASE, WITHOUT LONG-TERM CURRENT USE OF INSULIN, UNSPECIFIED CKD STAGE (MULTI): ICD-10-CM

## 2024-11-14 DIAGNOSIS — Z87.891 FORMER CIGARETTE SMOKER: ICD-10-CM

## 2024-11-14 DIAGNOSIS — E78.5 DYSLIPIDEMIA: ICD-10-CM

## 2024-11-14 DIAGNOSIS — Z86.79 H/O ORTHOSTATIC HYPOTENSION: ICD-10-CM

## 2024-11-14 DIAGNOSIS — I25.10 CAD S/P PERCUTANEOUS CORONARY ANGIOPLASTY: ICD-10-CM

## 2024-11-14 DIAGNOSIS — Z98.61 CAD S/P PERCUTANEOUS CORONARY ANGIOPLASTY: ICD-10-CM

## 2024-11-14 DIAGNOSIS — I10 PRIMARY HYPERTENSION: ICD-10-CM

## 2024-11-14 DIAGNOSIS — I21.3 ST ELEVATION MYOCARDIAL INFARCTION (STEMI), UNSPECIFIED ARTERY (MULTI): ICD-10-CM

## 2024-11-14 PROCEDURE — 3048F LDL-C <100 MG/DL: CPT | Performed by: INTERNAL MEDICINE

## 2024-11-14 PROCEDURE — 3079F DIAST BP 80-89 MM HG: CPT | Performed by: INTERNAL MEDICINE

## 2024-11-14 PROCEDURE — 4010F ACE/ARB THERAPY RXD/TAKEN: CPT | Performed by: INTERNAL MEDICINE

## 2024-11-14 PROCEDURE — 99213 OFFICE O/P EST LOW 20 MIN: CPT | Performed by: INTERNAL MEDICINE

## 2024-11-14 PROCEDURE — 1036F TOBACCO NON-USER: CPT | Performed by: INTERNAL MEDICINE

## 2024-11-14 PROCEDURE — 3052F HG A1C>EQUAL 8.0%<EQUAL 9.0%: CPT | Performed by: INTERNAL MEDICINE

## 2024-11-14 PROCEDURE — 3008F BODY MASS INDEX DOCD: CPT | Performed by: INTERNAL MEDICINE

## 2024-11-14 PROCEDURE — 1159F MED LIST DOCD IN RCRD: CPT | Performed by: INTERNAL MEDICINE

## 2024-11-14 PROCEDURE — 3074F SYST BP LT 130 MM HG: CPT | Performed by: INTERNAL MEDICINE

## 2024-11-14 PROCEDURE — 3060F POS MICROALBUMINURIA REV: CPT | Performed by: INTERNAL MEDICINE

## 2024-11-14 RX ORDER — PANTOPRAZOLE SODIUM 40 MG/1
40 TABLET, DELAYED RELEASE ORAL
COMMUNITY
Start: 2024-10-22

## 2024-11-14 NOTE — PROGRESS NOTES
Referred by Dr. Mcintosh ref. provider found provider found for   Chief Complaint   Patient presents with    Follow-up     1 year        History of Present Illness  Peyman Khanna is a 69 y.o. year old male patient is here for follow-up.  Doing well from a cardiac standpoint no complaint no symptoms of chest pain or shortness of breath.  Lab work reviewed and cholesterol is adequately controlled.  Denies any symptoms of palpitation or syncope.  His diabetes is marginally controlled and tests.  Followed by his primary care physician.  I discussed with the patient Kneifl continue medication will call for any problem and follow-up as scheduled    Past Medical History  Past Medical History:   Diagnosis Date    Cataract     CKD (chronic kidney disease)     Coronary artery disease     GERD (gastroesophageal reflux disease)     Hydronephrosis     Hypertension     Nephrolithiasis     Nocturia     Orthostatic hypotension     Personal history of other diseases of the digestive system     History of esophageal reflux    Personal history of other diseases of the digestive system     History of gastrointestinal hemorrhage    STEMI (ST elevation myocardial infarction) (Multi)     Syncope     Type 2 diabetes mellitus     Urinary tract infection     Urinary urgency     VT (ventricular tachycardia) (Multi)        Social History  Social History     Tobacco Use    Smoking status: Former     Current packs/day: 0.00     Average packs/day: 1 pack/day for 38.0 years (38.0 ttl pk-yrs)     Types: Cigarettes     Start date:      Quit date: 2019     Years since quittin.8    Smokeless tobacco: Never   Vaping Use    Vaping status: Never Used   Substance Use Topics    Alcohol use: Not Currently    Drug use: Never       Family History     Family History   Problem Relation Name Age of Onset    Diabetes Father      Emphysema Father      Cancer Father      Stroke Brother      Cancer Other          Multiple Family Members    Diabetes Other           "Multiple Family Members       Review of Systems  As per HPI, all other systems reviewed and negative.    Allergies:  No Known Allergies     Outpatient Medications:  Current Outpatient Medications   Medication Instructions    aspirin 81 mg chewable tablet 1 tablet, Daily    atorvastatin (LIPITOR) 40 mg, oral, Nightly    BD Brandi 2nd Gen Pen Needle 32 gauge x 5/32\" needle     calcitriol (ROCALTROL) 0.25 mcg    insulin lispro protamin-lispro (HumaLOG Mix 75-25) 100 unit/mL (75-25) injection 10 Units, 2 times daily    lisinopril 5 mg tablet     metoprolol tartrate (LOPRESSOR) 25 mg, oral, Daily    nitroglycerin (NITROSTAT) 0.4 mg, Every 5 min PRN    oxyCODONE-acetaminophen (Percocet) 5-325 mg tablet 1 tablet, oral, Every 6 hours PRN    pantoprazole (PROTONIX) 40 mg, Daily before breakfast         Vitals:  Vitals:    11/14/24 0932   BP: 120/84   Pulse: 80       Physical Exam:  Physical Exam  Vitals and nursing note reviewed.   Constitutional:       Appearance: Normal appearance.   HENT:      Head: Normocephalic and atraumatic.   Eyes:      Extraocular Movements: Extraocular movements intact.      Pupils: Pupils are equal, round, and reactive to light.   Cardiovascular:      Rate and Rhythm: Normal rate and regular rhythm.      Pulses: Normal pulses.   Pulmonary:      Effort: Pulmonary effort is normal.      Breath sounds: Normal breath sounds.   Musculoskeletal:         General: Normal range of motion.      Cervical back: Normal range of motion.      Right lower leg: No edema.      Left lower leg: No edema.   Skin:     General: Skin is warm and dry.   Neurological:      General: No focal deficit present.      Mental Status: He is alert and oriented to person, place, and time.             Assessment/Plan   Diagnoses and all orders for this visit:  CAD S/P percutaneous coronary angioplasty  Primary hypertension  ST elevation myocardial infarction (STEMI), unspecified artery (Multi)  H/O orthostatic " hypotension  Dyslipidemia  Type 2 diabetes mellitus with chronic kidney disease, without long-term current use of insulin, unspecified CKD stage (Multi)  Chronic kidney disease, stage 3a (Multi)  BMI 24.0-24.9, adult  Former cigarette smoker          Riri Aranda MD [facility]  Interventional Cardiology   of [facility]     Thank you for allowing me to participate in the care of this patient. Please do not hesitate to contact me with any further questions or concerns.

## 2024-11-14 NOTE — PATIENT INSTRUCTIONS
Follow up office visit in 1 year.  Continue same medications/treatment.  Patient educated on proper medication use.  Patient educated on risk factor modification.  Please bring any lab results from other providers / physicians to your next appointment.    Please bring all medicines, vitamins and herbal supplements with you when you come to the office.    Prescriptions will not be filled unless you are compliant with your follow up appointments or have a follow up  appointment scheduled as per instruction of your physician.  Refills should be requested at the time of  Your visit.  Jolanta BOURNE LPN, am scribing for and in the presence of  Dr. Riri Aranda MD, FACC

## 2024-12-23 DIAGNOSIS — R82.89 ABNORMAL URINE CYTOLOGY: ICD-10-CM

## 2024-12-23 DIAGNOSIS — N20.1 URETERAL CALCULI: ICD-10-CM

## 2024-12-23 DIAGNOSIS — N20.0 NEPHROLITHIASIS: ICD-10-CM

## 2024-12-23 DIAGNOSIS — R31.0 GROSS HEMATURIA: ICD-10-CM

## 2024-12-24 RX ORDER — PEN NEEDLE, DIABETIC 32GX 5/32"
NEEDLE, DISPOSABLE MISCELLANEOUS
Qty: 180 EACH | Refills: 3 | Status: SHIPPED | OUTPATIENT
Start: 2024-12-24

## 2025-01-02 ENCOUNTER — HOSPITAL ENCOUNTER (OUTPATIENT)
Dept: RADIOLOGY | Facility: CLINIC | Age: 70
Discharge: HOME | End: 2025-01-02
Payer: COMMERCIAL

## 2025-01-02 ENCOUNTER — LAB (OUTPATIENT)
Dept: LAB | Facility: LAB | Age: 70
End: 2025-01-02
Payer: COMMERCIAL

## 2025-01-02 DIAGNOSIS — N20.0 NEPHROLITHIASIS: ICD-10-CM

## 2025-01-02 DIAGNOSIS — R82.89 ABNORMAL URINE CYTOLOGY: ICD-10-CM

## 2025-01-02 DIAGNOSIS — R31.0 GROSS HEMATURIA: ICD-10-CM

## 2025-01-02 LAB
APPEARANCE UR: CLEAR
BILIRUB UR STRIP.AUTO-MCNC: NEGATIVE MG/DL
COLOR UR: ABNORMAL
GLUCOSE UR STRIP.AUTO-MCNC: ABNORMAL MG/DL
HYALINE CASTS #/AREA URNS AUTO: ABNORMAL /LPF
KETONES UR STRIP.AUTO-MCNC: NEGATIVE MG/DL
LEUKOCYTE ESTERASE UR QL STRIP.AUTO: NEGATIVE
MUCOUS THREADS #/AREA URNS AUTO: ABNORMAL /LPF
NITRITE UR QL STRIP.AUTO: NEGATIVE
PH UR STRIP.AUTO: 5.5 [PH]
PROT UR STRIP.AUTO-MCNC: ABNORMAL MG/DL
PSA SERPL-MCNC: 2.06 NG/ML
RBC # UR STRIP.AUTO: NEGATIVE /UL
RBC #/AREA URNS AUTO: ABNORMAL /HPF
SP GR UR STRIP.AUTO: 1.02
SQUAMOUS #/AREA URNS AUTO: ABNORMAL /HPF
UROBILINOGEN UR STRIP.AUTO-MCNC: NORMAL MG/DL
WBC #/AREA URNS AUTO: ABNORMAL /HPF

## 2025-01-02 PROCEDURE — 81001 URINALYSIS AUTO W/SCOPE: CPT

## 2025-01-02 PROCEDURE — 87086 URINE CULTURE/COLONY COUNT: CPT

## 2025-01-02 PROCEDURE — 84153 ASSAY OF PSA TOTAL: CPT

## 2025-01-03 LAB — BACTERIA UR CULT: NORMAL

## 2025-01-10 ENCOUNTER — HOSPITAL ENCOUNTER (OUTPATIENT)
Dept: RADIOLOGY | Facility: CLINIC | Age: 70
Discharge: HOME | End: 2025-01-10
Payer: COMMERCIAL

## 2025-01-10 PROCEDURE — 74176 CT ABD & PELVIS W/O CONTRAST: CPT

## 2025-01-10 PROCEDURE — 74176 CT ABD & PELVIS W/O CONTRAST: CPT | Performed by: RADIOLOGY

## 2025-01-13 ENCOUNTER — APPOINTMENT (OUTPATIENT)
Dept: UROLOGY | Facility: CLINIC | Age: 70
End: 2025-01-13
Payer: COMMERCIAL

## 2025-01-14 ENCOUNTER — APPOINTMENT (OUTPATIENT)
Dept: UROLOGY | Facility: CLINIC | Age: 70
End: 2025-01-14
Payer: COMMERCIAL

## 2025-01-14 VITALS
HEIGHT: 68 IN | RESPIRATION RATE: 16 BRPM | DIASTOLIC BLOOD PRESSURE: 81 MMHG | BODY MASS INDEX: 24.06 KG/M2 | SYSTOLIC BLOOD PRESSURE: 150 MMHG | WEIGHT: 158.73 LBS | HEART RATE: 88 BPM

## 2025-01-14 DIAGNOSIS — R31.0 GROSS HEMATURIA: ICD-10-CM

## 2025-01-14 DIAGNOSIS — N20.1 CALCULUS OF URETER: Primary | ICD-10-CM

## 2025-01-14 DIAGNOSIS — N39.0 URINARY TRACT INFECTION WITH HEMATURIA, SITE UNSPECIFIED: ICD-10-CM

## 2025-01-14 DIAGNOSIS — R31.9 URINARY TRACT INFECTION WITH HEMATURIA, SITE UNSPECIFIED: ICD-10-CM

## 2025-01-14 PROCEDURE — 3008F BODY MASS INDEX DOCD: CPT | Performed by: UROLOGY

## 2025-01-14 PROCEDURE — 1159F MED LIST DOCD IN RCRD: CPT | Performed by: UROLOGY

## 2025-01-14 PROCEDURE — G2211 COMPLEX E/M VISIT ADD ON: HCPCS | Performed by: UROLOGY

## 2025-01-14 PROCEDURE — 1036F TOBACCO NON-USER: CPT | Performed by: UROLOGY

## 2025-01-14 PROCEDURE — 3077F SYST BP >= 140 MM HG: CPT | Performed by: UROLOGY

## 2025-01-14 PROCEDURE — 3079F DIAST BP 80-89 MM HG: CPT | Performed by: UROLOGY

## 2025-01-14 PROCEDURE — 1126F AMNT PAIN NOTED NONE PRSNT: CPT | Performed by: UROLOGY

## 2025-01-14 PROCEDURE — 4010F ACE/ARB THERAPY RXD/TAKEN: CPT | Performed by: UROLOGY

## 2025-01-14 PROCEDURE — 99214 OFFICE O/P EST MOD 30 MIN: CPT | Performed by: UROLOGY

## 2025-01-14 PROCEDURE — 1160F RVW MEDS BY RX/DR IN RCRD: CPT | Performed by: UROLOGY

## 2025-01-14 ASSESSMENT — ENCOUNTER SYMPTOMS
DYSURIA: 0
DIFFICULTY URINATING: 0
HEMATURIA: 0
FREQUENCY: 0

## 2025-01-14 ASSESSMENT — PAIN SCALES - GENERAL: PAINLEVEL_OUTOF10: 0-NO PAIN

## 2025-01-14 NOTE — PATIENT INSTRUCTIONS
Patient Discussion/Summary     It was very nice to see you again. Today we reviewed your testing, Your Cat scan reveals supple less than 3 mm stones in the right kidney.. Urine testing shows no evidence of blood, infection or cancer cells.  Your PSA is normal at 2.06.  He will see us again in 1 year.     This note was created with voice-recognition software and was not corrected for typographical or grammatical errors.

## 2025-01-14 NOTE — PROGRESS NOTES
Patient denies any pain today. Patient has not had any recent surgeries or hospital admits. Patient denies any concerns about falling or safety. Patient has no urinary issues. CV    Review of Systems   Genitourinary:  Negative for difficulty urinating, dysuria, enuresis, frequency, hematuria, penile pain, penile swelling, scrotal swelling, testicular pain and urgency.   All other systems reviewed and are negative.

## 2025-01-14 NOTE — LETTER
"January 14, 2025     Moisés Owusu DO  [id number]  [facility] [address]    Patient: Peyman Khanna   YOB: 1955   Date of Visit: 1/14/2025       Dear Dr. Moisés Owusu DO:    Thank you for referring Peyman Khanna to me for evaluation. Below are my notes for this consultation.  If you have questions, please do not hesitate to call me. I look forward to following your patient along with you.       Sincerely,     Jose Red MD      CC: No Recipients  ______________________________________________________________________________________          Provider Impressions     1/18/2016 established patient returns for follow-up. Patient initially seen in May 2010. Mr. Khanna is a a 70 year-old white male, he has a long history of NEPHROLITHIASIS with a significant history of NONCOMPLIANCE. January 2015 patient was seen at [facility] with severe HYDRONEPHROSIS bilaterally, bilateral OBSTRUCTING URETERAL CALCULI and a STAGHORN RENAL CALCULI. Patient went to the OR for placement of left and right URETERAL STENTS and STONE MANIPULATION. The patient then went main campus  and per Dr. Taylor: The patient is an  at [employer]. He has no family history of prostate or breast cancer. He has a 40-pack-year cigarette smoking history. He presently smokes one pack of cigarettes per day.     Left distal URETERAL STONE 1.3 cm treated with URETEROSCOPY 2/24/15  Wih CYSTOLITHOLAPAXY 2/24/15  Right . PERCUTANEOUS NEPHROLITHOTOMY with cyberwand and antetgrade ureteroscopy and LL and basketing of impacted distal right ureteral stone 3/10/15  UTI with Patient on IV antibiotics through PIC line finished  Bilateral JJ stents removed today without difficulty     01/18/17, patient states on rare occasions he experiences \"twinges\" in his right kidney. Renal colic CAT scan shows bilateral, 3 mm nonobstructing STONES. Urinalysis, urine culture are both negative. Urine CYTOLOGY is ATYPICAL. PSA is 2.4. He will return in " 1 year.     01/17/18, patient states he has occasional discomfort in his right kidney. Renal colic CAT scan is unchanged showing bilateral 3 mm nonobstructing STONES. The majority urine the left lower pole. Urinalysis and urine culture are negative as well as urine cytology. PSA is 3.3. He will return in 1 year.     01/29/19, patient states that he has passed 3 small stones over the last year. His renal colic CAT scan shows bilateral STONES, the largest being 6 mm in the left kidney. We will proceed with an IVP to confirm size and location. If greater than 5 mm, the patient strongly wishes to pursue ESWL and not have an obstructing stone again. Urinalysis is negative. PSA is stable at 3.3.     03/09/19, patient is complaining of right-sided flank pain. However the KUB reveals a 3 mm stones on the right side and a 6 mm stone on the left. His creatinine was 1.6 and he could not undergo an IVP. We will schedule ESWL of his left renal calculus. Nurse practitioner Juan David will provide preoperative medical risk stratification. His primary care physician, Dr. Doty is no longer with the practice.     03/28/19, ESWL of a 0.7 cm left mid renal calculus.     CALCIUM OXALATE stones     05/02/19, OR, cystoscopy, removal of left double-J ureteral stent, left retropyelogram, left ureteroscopy     12/20/19, MI, 3 cardiac stents     BRILINTA     01/10/20, BIRTHDAY BOY, patient arrives alone. He is recovering from his MI and 3 recent stent placements. No complaints of flank pain. Urinalysis and urine culture are normal. Renal colic CAT scan shows a 2 mm stone in the left kidney. PSA 2.10     October 2020, hospitalized for GI bleed     01/08/21, patient arrives alone. He states that he intermittently passes a small 2 or 3 mm stone every 3 months. He has no complaints of pain. Urinalysis, urine culture and urine cytology are all negative. PSA is normal at 1.80. He has bilateral 3 mm stones in the kidneys as seen on his renal colic CAT  scan. Also a 1.8 cm left adrenal nodule. He will return in 1 year.     December 2 to December 21, 2021, admitted to [facility] with Covid pneumonia.     March 8, 2022, telephone call, renal ultrasound shows a 7 mm, 4 mm and 6 mm right renal calculus. 6 mm left renal calculus. 8 mm bladder stone. He will obtain a renal colic CAT scan and KUB     March 16, 2022, patient arrives alone. He has intermittent right flank pain. None renal colic CAT scan and KUB reveals a 4 mm stone in the bladder which the patient states he is passed. Also stones in the left kidney measuring 2 to 3 mm and in the right kidney 3 to 4 mm. He wishes to continue to observe and I agree with that plan. Urinalysis and urine culture were negative. He will return in 1 year.     February 1, 2023, patient arrives alone. He recently passed a stone following his renal colic CAT scan. Urinalysis no blood. Urine culture no growth. Renal colic CAT scan shows a 3 mm stone in the right kidney and a 3 mm stone in the left kidney, both nonobstructing. There was a 5 mm bladder calculus which evidently he has passed. He will return in 1 year.     January 23, 2024, telephone call, renal colic CAT scan identifies a 7 mm right distal ureteral calculus.  We will obtain a creatinine and CT urogram prior to his follow-up visit.     February 9, 2024, CT urogram denied by insurance.  Last years renal colic CAT scan showed a 5 mm distal ureteral calculus.     March 15, 2024, patient arrives alone.  He states that he recently passed a stone.  He also did not obtain his urine studies or PSA.  I am reluctant to believe that he passed this 8 mm stone without any discomfort.  However I am unwilling to schedule surgery unless I have confirmation.  The CT urogram would have provided that confirmation however it was denied by his insurance.  Therefore we will repeat the renal colic CAT scan now and if the stone still remains in the distal right ureter with hydronephrosis, then we  will proceed with ureteroscopy and laser lithotripsy.  He will also obtain his urine studies and PSA.     April 29, 2024, patient arrives alone.  He continues to complain of right lower quadrant pain.  Renal colic CAT scan states that the 8 mm stone has moved just medial to the right UVJ.  I personally reviewed this CAT scan images with the patient on my computer monitor in the examination room.  I believe the stone is not in the bladder but at the UVJ and he wishes to proceed with laser lithotripsy and ureteroscopy.  PSA is normal at 2.00.  He does have a UTI of Staphylococcus which we will treat with Bactrim.  Also, multiple nonobstructing bilateral renal calculi measuring up to 3 mm.  He will receive preoperative cardiac risk stratification from Dr. Riri Aranda surgery will be scheduled for May 16, 2024    May 23, 2024, OR, right ureteroscopy with laser lithotripsy, right ureteroscopy with stone removal.    CALCIUM OXALATE stones    May 29, 2024, postoperative KUB shows no stones.    January 14, 2025, patient arrives alone.  He has no new urologic complaints.  Urinalysis is negative for blood.  Urine culture shows no growth.  Urine cytology is lacking atypia.  PSA is normal at 2.06.  Renal colic CAT scan shows multiple, less than 3 mm stones which are nonobstructing in the right kidney.  He will return in 1 year.     PLAN:     #1 patient will return in January 2026 with renal colic CT, urine studies, and PSA.     Physical Exam  Vitals and nursing note reviewed.   Constitutional:       Appearance: Normal appearance.   HENT:      Head: Normocephalic and atraumatic.   Pulmonary:      Effort: Pulmonary effort is normal.   Abdominal:      Palpations: Abdomen is soft.      Tenderness: There is no abdominal tenderness.   Musculoskeletal:         General: Normal range of motion.      Cervical back: Normal range of motion and neck supple.   Neurological:      General: No focal deficit present.      Mental Status: He is  alert and oriented to person, place, and time.   Psychiatric:         Mood and Affect: Mood normal.         Behavior: Behavior normal.       This note was created with voice recognition software and was not corrected for typographical or grammatical errors

## 2025-01-14 NOTE — PROGRESS NOTES
"      Provider Impressions     1/18/2016 established patient returns for follow-up. Patient initially seen in May 2010. Mr. Khanna is a a 70 year-old white male, he has a long history of NEPHROLITHIASIS with a significant history of NONCOMPLIANCE. January 2015 patient was seen at [facility] with severe HYDRONEPHROSIS bilaterally, bilateral OBSTRUCTING URETERAL CALCULI and a STAGHORN RENAL CALCULI. Patient went to the OR for placement of left and right URETERAL STENTS and STONE MANIPULATION. The patient then went main [facility] and per Dr. Taylor: The patient is an  at [employer]. He has no family history of prostate or breast cancer. He has a 40-pack-year cigarette smoking history. He presently smokes one pack of cigarettes per day.     Left distal URETERAL STONE 1.3 cm treated with URETEROSCOPY 2/24/15  Wih CYSTOLITHOLAPAXY 2/24/15  Right . PERCUTANEOUS NEPHROLITHOTOMY with cyberwand and antetgrade ureteroscopy and LL and basketing of impacted distal right ureteral stone 3/10/15  UTI with Patient on IV antibiotics through PIC line finished  Bilateral JJ stents removed today without difficulty     01/18/17, patient states on rare occasions he experiences \"twinges\" in his right kidney. Renal colic CAT scan shows bilateral, 3 mm nonobstructing STONES. Urinalysis, urine culture are both negative. Urine CYTOLOGY is ATYPICAL. PSA is 2.4. He will return in 1 year.     01/17/18, patient states he has occasional discomfort in his right kidney. Renal colic CAT scan is unchanged showing bilateral 3 mm nonobstructing STONES. The majority urine the left lower pole. Urinalysis and urine culture are negative as well as urine cytology. PSA is 3.3. He will return in 1 year.     01/29/19, patient states that he has passed 3 small stones over the last year. His renal colic CAT scan shows bilateral STONES, the largest being 6 mm in the left kidney. We will proceed with an IVP to confirm size and location. If greater than 5 mm, the " patient strongly wishes to pursue ESWL and not have an obstructing stone again. Urinalysis is negative. PSA is stable at 3.3.     03/09/19, patient is complaining of right-sided flank pain. However the KUB reveals a 3 mm stones on the right side and a 6 mm stone on the left. His creatinine was 1.6 and he could not undergo an IVP. We will schedule ESWL of his left renal calculus. Nurse practitioner Juan David will provide preoperative medical risk stratification. His primary care physician, Dr. Doty is no longer with the practice.     03/28/19, ESWL of a 0.7 cm left mid renal calculus.     CALCIUM OXALATE stones     05/02/19, OR, cystoscopy, removal of left double-J ureteral stent, left retropyelogram, left ureteroscopy     12/20/19, MI, 3 cardiac stents     BRILINTA     01/10/20, BIRTHDAY BOY, patient arrives alone. He is recovering from his MI and 3 recent stent placements. No complaints of flank pain. Urinalysis and urine culture are normal. Renal colic CAT scan shows a 2 mm stone in the left kidney. PSA 2.10     October 2020, hospitalized for GI bleed     01/08/21, patient arrives alone. He states that he intermittently passes a small 2 or 3 mm stone every 3 months. He has no complaints of pain. Urinalysis, urine culture and urine cytology are all negative. PSA is normal at 1.80. He has bilateral 3 mm stones in the kidneys as seen on his renal colic CAT scan. Also a 1.8 cm left adrenal nodule. He will return in 1 year.     December 2 to December 21, 2021, admitted to [facility] with Covid pneumonia.     March 8, 2022, telephone call, renal ultrasound shows a 7 mm, 4 mm and 6 mm right renal calculus. 6 mm left renal calculus. 8 mm bladder stone. He will obtain a renal colic CAT scan and KUB     March 16, 2022, patient arrives alone. He has intermittent right flank pain. None renal colic CAT scan and KUB reveals a 4 mm stone in the bladder which the patient states he is passed. Also stones in the left kidney measuring 2  to 3 mm and in the right kidney 3 to 4 mm. He wishes to continue to observe and I agree with that plan. Urinalysis and urine culture were negative. He will return in 1 year.     February 1, 2023, patient arrives alone. He recently passed a stone following his renal colic CAT scan. Urinalysis no blood. Urine culture no growth. Renal colic CAT scan shows a 3 mm stone in the right kidney and a 3 mm stone in the left kidney, both nonobstructing. There was a 5 mm bladder calculus which evidently he has passed. He will return in 1 year.     January 23, 2024, telephone call, renal colic CAT scan identifies a 7 mm right distal ureteral calculus.  We will obtain a creatinine and CT urogram prior to his follow-up visit.     February 9, 2024, CT urogram denied by insurance.  Last years renal colic CAT scan showed a 5 mm distal ureteral calculus.     March 15, 2024, patient arrives alone.  He states that he recently passed a stone.  He also did not obtain his urine studies or PSA.  I am reluctant to believe that he passed this 8 mm stone without any discomfort.  However I am unwilling to schedule surgery unless I have confirmation.  The CT urogram would have provided that confirmation however it was denied by his insurance.  Therefore we will repeat the renal colic CAT scan now and if the stone still remains in the distal right ureter with hydronephrosis, then we will proceed with ureteroscopy and laser lithotripsy.  He will also obtain his urine studies and PSA.     April 29, 2024, patient arrives alone.  He continues to complain of right lower quadrant pain.  Renal colic CAT scan states that the 8 mm stone has moved just medial to the right UVJ.  I personally reviewed this CAT scan images with the patient on my computer monitor in the examination room.  I believe the stone is not in the bladder but at the UVJ and he wishes to proceed with laser lithotripsy and ureteroscopy.  PSA is normal at 2.00.  He does have a UTI of  Staphylococcus which we will treat with Bactrim.  Also, multiple nonobstructing bilateral renal calculi measuring up to 3 mm.  He will receive preoperative cardiac risk stratification from Dr. Riri Aranda surgery will be scheduled for May 16, 2024    May 23, 2024, OR, right ureteroscopy with laser lithotripsy, right ureteroscopy with stone removal.    CALCIUM OXALATE stones    May 29, 2024, postoperative KUB shows no stones.    January 14, 2025, patient arrives alone.  He has no new urologic complaints.  Urinalysis is negative for blood.  Urine culture shows no growth.  Urine cytology is lacking atypia.  PSA is normal at 2.06.  Renal colic CAT scan shows multiple, less than 3 mm stones which are nonobstructing in the right kidney.  He will return in 1 year.     PLAN:     #1 patient will return in January 2026 with renal colic CT, urine studies, and PSA.     Physical Exam  Vitals and nursing note reviewed.   Constitutional:       Appearance: Normal appearance.   HENT:      Head: Normocephalic and atraumatic.   Pulmonary:      Effort: Pulmonary effort is normal.   Abdominal:      Palpations: Abdomen is soft.      Tenderness: There is no abdominal tenderness.   Musculoskeletal:         General: Normal range of motion.      Cervical back: Normal range of motion and neck supple.   Neurological:      General: No focal deficit present.      Mental Status: He is alert and oriented to person, place, and time.   Psychiatric:         Mood and Affect: Mood normal.         Behavior: Behavior normal.       This note was created with voice recognition software and was not corrected for typographical or grammatical errors

## 2025-02-06 DIAGNOSIS — I10 PRIMARY HYPERTENSION: ICD-10-CM

## 2025-02-06 DIAGNOSIS — E78.5 DYSLIPIDEMIA: ICD-10-CM

## 2025-02-06 RX ORDER — ATORVASTATIN CALCIUM 40 MG/1
40 TABLET, FILM COATED ORAL NIGHTLY
Qty: 90 TABLET | Refills: 3 | Status: SHIPPED | OUTPATIENT
Start: 2025-02-06

## 2025-02-06 RX ORDER — METOPROLOL TARTRATE 25 MG/1
25 TABLET, FILM COATED ORAL DAILY
Qty: 90 TABLET | Refills: 3 | Status: SHIPPED | OUTPATIENT
Start: 2025-02-06

## 2025-02-14 DIAGNOSIS — E11.65 TYPE 2 DIABETES MELLITUS WITH HYPERGLYCEMIA, WITHOUT LONG-TERM CURRENT USE OF INSULIN (HCC): ICD-10-CM

## 2025-02-14 LAB
ANION GAP SERPL CALCULATED.3IONS-SCNC: 10 MEQ/L (ref 9–15)
BUN SERPL-MCNC: 19 MG/DL (ref 8–23)
CALCIUM SERPL-MCNC: 9.3 MG/DL (ref 8.5–9.9)
CHLORIDE SERPL-SCNC: 101 MEQ/L (ref 95–107)
CHOLEST SERPL-MCNC: 115 MG/DL (ref 0–199)
CO2 SERPL-SCNC: 27 MEQ/L (ref 20–31)
CREAT SERPL-MCNC: 1.94 MG/DL (ref 0.7–1.2)
ESTIMATED AVERAGE GLUCOSE: 212 MG/DL
GLUCOSE SERPL-MCNC: 199 MG/DL (ref 70–99)
HBA1C MFR BLD: 9 % (ref 4–6)
HDLC SERPL-MCNC: 39 MG/DL (ref 40–59)
LDLC SERPL CALC-MCNC: 63 MG/DL (ref 0–129)
POTASSIUM SERPL-SCNC: 4.3 MEQ/L (ref 3.4–4.9)
SODIUM SERPL-SCNC: 138 MEQ/L (ref 135–144)
TRIGL SERPL-MCNC: 67 MG/DL (ref 0–150)

## 2025-02-24 ENCOUNTER — OFFICE VISIT (OUTPATIENT)
Dept: ENDOCRINOLOGY | Age: 70
End: 2025-02-24
Payer: COMMERCIAL

## 2025-02-24 VITALS
WEIGHT: 158 LBS | HEIGHT: 68 IN | DIASTOLIC BLOOD PRESSURE: 72 MMHG | SYSTOLIC BLOOD PRESSURE: 127 MMHG | OXYGEN SATURATION: 95 % | BODY MASS INDEX: 23.95 KG/M2

## 2025-02-24 DIAGNOSIS — E11.65 TYPE 2 DIABETES MELLITUS WITH HYPERGLYCEMIA, WITHOUT LONG-TERM CURRENT USE OF INSULIN (HCC): Primary | ICD-10-CM

## 2025-02-24 LAB
CHP ED QC CHECK: NORMAL
GLUCOSE BLD-MCNC: 406 MG/DL

## 2025-02-24 PROCEDURE — G8420 CALC BMI NORM PARAMETERS: HCPCS | Performed by: INTERNAL MEDICINE

## 2025-02-24 PROCEDURE — 99213 OFFICE O/P EST LOW 20 MIN: CPT | Performed by: INTERNAL MEDICINE

## 2025-02-24 PROCEDURE — 1126F AMNT PAIN NOTED NONE PRSNT: CPT | Performed by: INTERNAL MEDICINE

## 2025-02-24 PROCEDURE — 1036F TOBACCO NON-USER: CPT | Performed by: INTERNAL MEDICINE

## 2025-02-24 PROCEDURE — G8427 DOCREV CUR MEDS BY ELIG CLIN: HCPCS | Performed by: INTERNAL MEDICINE

## 2025-02-24 PROCEDURE — 3052F HG A1C>EQUAL 8.0%<EQUAL 9.0%: CPT | Performed by: INTERNAL MEDICINE

## 2025-02-24 PROCEDURE — 3017F COLORECTAL CA SCREEN DOC REV: CPT | Performed by: INTERNAL MEDICINE

## 2025-02-24 PROCEDURE — 82962 GLUCOSE BLOOD TEST: CPT | Performed by: INTERNAL MEDICINE

## 2025-02-24 PROCEDURE — 1123F ACP DISCUSS/DSCN MKR DOCD: CPT | Performed by: INTERNAL MEDICINE

## 2025-02-24 PROCEDURE — 2022F DILAT RTA XM EVC RTNOPTHY: CPT | Performed by: INTERNAL MEDICINE

## 2025-02-24 PROCEDURE — 1159F MED LIST DOCD IN RCRD: CPT | Performed by: INTERNAL MEDICINE

## 2025-02-24 RX ORDER — INSULIN LISPRO 100 [IU]/ML
INJECTION, SUSPENSION SUBCUTANEOUS
Qty: 30 ML | Refills: 3
Start: 2025-02-24

## 2025-02-24 NOTE — PROGRESS NOTES
2/24/2025    Assessment:       Diagnosis Orders   1. Type 2 diabetes mellitus with hyperglycemia, without long-term current use of insulin ([facility])  POCT Glucose            PLAN:     Orders Placed This Encounter   Procedures    Hemoglobin A1C     Standing Status:   Future     Standing Expiration Date:   2/24/2026    Basic Metabolic Panel     Standing Status:   Future     Standing Expiration Date:   2/24/2026    POCT Glucose     Orders Placed This Encounter   Medications    insulin lispro protamine & lispro (HUMALOG MIX 75/25 KWIKPEN) (75-25) 100 UNIT per ML SUPN injection pen     Sig: INJECT 10-14  UNITS TWICE A DAY FOR GLUCOSE MORE THAN 100     Dispense:  30 mL     Refill:  3     Increase dose of insulin to 10 to 14 units twice daily follow-up in 3 to 6 months time    Orders Placed This Encounter   Procedures    POCT Glucose     No orders of the defined types were placed in this encounter.    No follow-ups on file.  Subjective:     Chief Complaint   Patient presents with    Diabetes     Vitals:    02/24/25 1309   BP: 127/72   SpO2: 95%   Weight: 71.7 kg (158 lb)   Height: 1.727 m (5' 8\")     Wt Readings from Last 3 Encounters:   02/24/25 71.7 kg (158 lb)   08/22/24 68.9 kg (152 lb)   02/22/24 70.3 kg (155 lb)     BP Readings from Last 3 Encounters:   02/24/25 127/72   08/22/24 107/65   02/22/24 113/67       Follow-up on type 2 diabetes patient on 10 units twice a day of 75/25 Humalog hemoglobin A1c was 9.0 increased from before because of more carb sweet intake  History of chronic kidney disease average glucose close to 220  Hemoglobin A1C       Date                     Value               Ref Range           Status                02/14/2025               9.0 (H)             4.0 - 6.0 %         Final            ----------      Diabetes  He presents for his follow-up diabetic visit. He has type 2 diabetes mellitus. His disease course has been fluctuating. Symptoms are worsening. Diabetic complications include

## 2025-02-25 ASSESSMENT — ENCOUNTER SYMPTOMS: EYES NEGATIVE: 1

## 2025-08-22 DIAGNOSIS — E11.65 TYPE 2 DIABETES MELLITUS WITH HYPERGLYCEMIA, WITHOUT LONG-TERM CURRENT USE OF INSULIN (HCC): ICD-10-CM

## 2025-08-22 LAB
ANION GAP SERPL CALCULATED.3IONS-SCNC: 11 MEQ/L (ref 9–15)
BUN SERPL-MCNC: 21 MG/DL (ref 8–23)
CALCIUM SERPL-MCNC: 9.1 MG/DL (ref 8.5–9.9)
CHLORIDE SERPL-SCNC: 105 MEQ/L (ref 95–107)
CO2 SERPL-SCNC: 24 MEQ/L (ref 20–31)
CREAT SERPL-MCNC: 1.95 MG/DL (ref 0.7–1.2)
GLUCOSE SERPL-MCNC: 193 MG/DL (ref 70–99)
POTASSIUM SERPL-SCNC: 4.5 MEQ/L (ref 3.4–4.9)
SODIUM SERPL-SCNC: 140 MEQ/L (ref 135–144)

## 2025-08-23 LAB
ESTIMATED AVERAGE GLUCOSE: 192 MG/DL
HBA1C MFR BLD: 8.3 % (ref 4–6)

## 2025-11-13 ENCOUNTER — APPOINTMENT (OUTPATIENT)
Dept: CARDIOLOGY | Facility: CLINIC | Age: 70
End: 2025-11-13
Payer: COMMERCIAL

## 2026-01-09 ENCOUNTER — APPOINTMENT (OUTPATIENT)
Dept: UROLOGY | Facility: CLINIC | Age: 71
End: 2026-01-09
Payer: COMMERCIAL

## (undated) DEVICE — SYRINGE, 10 CC, LUER LOCK

## (undated) DEVICE — GLOVE, SURGICAL, PROTEXIS PI , 7.0, PF, LF

## (undated) DEVICE — DRAINBAG, 15.5 X 31, 2600/2800 UROVIEW, STERILE

## (undated) DEVICE — TRAY, SKIN SCRUB, WET PREP, WITH 4 COMPARMENT

## (undated) DEVICE — GUIDEWIRE, SOLO FLEX HYBRID, .035 STRAIGHT TIP

## (undated) DEVICE — HOLMIUM MP 365 FORTEC FIBER

## (undated) DEVICE — NEEDLE, SAFETY, 18 G X 1.5 IN

## (undated) DEVICE — TUBING, SUCTION, 6MM X 10, CLEAN N-COND

## (undated) DEVICE — DRAPE, TIBURON, LITHOTOMY, W/FLUID CONTROL POUCH

## (undated) DEVICE — BAG, DRAINAGE, ANTI-REFLUX CHAMBER, 2000ML

## (undated) DEVICE — TRAY, MINOR, SINGLE BASIN, STERILE

## (undated) DEVICE — CATHETER, URETERAL, FLEXTIP, 6FR X 70CM, LF

## (undated) DEVICE — TOWELS 4-PK

## (undated) DEVICE — SOLUTION, IRRIGATION, USP, STERILE WATER, UROLOGICAL, 3000 ML, BAG

## (undated) DEVICE — GOWN, SURGICAL, ROYAL SILK, LG, STERILE

## (undated) DEVICE — Device

## (undated) DEVICE — HOLSTER, ELECTROSURGERY ACCESSORY, STERILE

## (undated) DEVICE — GUIDEWIRE, .035 X 150 ANGLED 3CM